# Patient Record
Sex: FEMALE | Race: WHITE
[De-identification: names, ages, dates, MRNs, and addresses within clinical notes are randomized per-mention and may not be internally consistent; named-entity substitution may affect disease eponyms.]

---

## 2020-11-25 ENCOUNTER — HOSPITAL ENCOUNTER (EMERGENCY)
Dept: HOSPITAL 63 - ER | Age: 80
Discharge: ANOTHER HOSPITAL | End: 2020-11-25
Payer: MEDICARE

## 2020-11-25 VITALS — SYSTOLIC BLOOD PRESSURE: 136 MMHG | DIASTOLIC BLOOD PRESSURE: 77 MMHG

## 2020-11-25 VITALS — HEIGHT: 70 IN | WEIGHT: 154.32 LBS | BODY MASS INDEX: 22.09 KG/M2

## 2020-11-25 DIAGNOSIS — I48.0: Primary | ICD-10-CM

## 2020-11-25 DIAGNOSIS — N39.0: ICD-10-CM

## 2020-11-25 LAB
ALBUMIN SERPL-MCNC: 3.3 G/DL (ref 3.4–5)
ALBUMIN/GLOB SERPL: 0.8 {RATIO} (ref 1–1.7)
ALP SERPL-CCNC: 166 U/L (ref 46–116)
ALT SERPL-CCNC: 22 U/L (ref 14–59)
ANION GAP SERPL CALC-SCNC: 8 MMOL/L (ref 6–14)
APTT PPP: YELLOW S
AST SERPL-CCNC: 19 U/L (ref 15–37)
BACTERIA #/AREA URNS HPF: (no result) /HPF
BASOPHILS # BLD AUTO: 0 X10^3/UL (ref 0–0.2)
BASOPHILS NFR BLD: 1 % (ref 0–3)
BILIRUB SERPL-MCNC: 0.4 MG/DL (ref 0.2–1)
BILIRUB UR QL STRIP: (no result)
BUN/CREAT SERPL: 24 (ref 6–20)
CA-I SERPL ISE-MCNC: 22 MG/DL (ref 7–20)
CALCIUM SERPL-MCNC: 8.9 MG/DL (ref 8.5–10.1)
CHLORIDE SERPL-SCNC: 104 MMOL/L (ref 98–107)
CO2 SERPL-SCNC: 27 MMOL/L (ref 21–32)
CREAT SERPL-MCNC: 0.9 MG/DL (ref 0.6–1)
EOSINOPHIL NFR BLD: 0.1 X10^3/UL (ref 0–0.7)
EOSINOPHIL NFR BLD: 1 % (ref 0–3)
ERYTHROCYTE [DISTWIDTH] IN BLOOD BY AUTOMATED COUNT: 14.3 % (ref 11.5–14.5)
FIBRINOGEN PPP-MCNC: (no result) MG/DL
GFR SERPLBLD BASED ON 1.73 SQ M-ARVRAT: 60.4 ML/MIN
GLOBULIN SER-MCNC: 4.1 G/DL (ref 2.2–3.8)
GLUCOSE SERPL-MCNC: 109 MG/DL (ref 70–99)
GLUCOSE UR STRIP-MCNC: (no result) MG/DL
HCT VFR BLD CALC: 42.5 % (ref 36–47)
HGB BLD-MCNC: 13.6 G/DL (ref 12–15.5)
LIPASE: 47 U/L (ref 73–393)
LYMPHOCYTES # BLD: 1.7 X10^3/UL (ref 1–4.8)
LYMPHOCYTES NFR BLD AUTO: 19 % (ref 24–48)
MAGNESIUM SERPL-MCNC: 2.2 MG/DL (ref 1.8–2.4)
MCH RBC QN AUTO: 29 PG (ref 25–35)
MCHC RBC AUTO-ENTMCNC: 32 G/DL (ref 31–37)
MCV RBC AUTO: 92 FL (ref 79–100)
MONO #: 0.7 X10^3/UL (ref 0–1.1)
MONOCYTES NFR BLD: 7 % (ref 0–9)
NEUT #: 6.7 X10^3UL (ref 1.8–7.7)
NEUTROPHILS NFR BLD AUTO: 73 % (ref 31–73)
NITRITE UR QL STRIP: (no result)
PLATELET # BLD AUTO: 347 X10^3/UL (ref 140–400)
POTASSIUM SERPL-SCNC: 4 MMOL/L (ref 3.5–5.1)
PROT SERPL-MCNC: 7.4 G/DL (ref 6.4–8.2)
RBC # BLD AUTO: 4.62 X10^6/UL (ref 3.5–5.4)
RBC #/AREA URNS HPF: (no result) /HPF (ref 0–2)
SODIUM SERPL-SCNC: 139 MMOL/L (ref 136–145)
SP GR UR STRIP: 1.02
SQUAMOUS #/AREA URNS LPF: (no result) /LPF
UROBILINOGEN UR-MCNC: 0.2 MG/DL
WBC # BLD AUTO: 9.1 X10^3/UL (ref 4–11)
WBC #/AREA URNS HPF: >40 /HPF (ref 0–4)

## 2020-11-25 PROCEDURE — 87086 URINE CULTURE/COLONY COUNT: CPT

## 2020-11-25 PROCEDURE — 83690 ASSAY OF LIPASE: CPT

## 2020-11-25 PROCEDURE — 99285 EMERGENCY DEPT VISIT HI MDM: CPT

## 2020-11-25 PROCEDURE — 85025 COMPLETE CBC W/AUTO DIFF WBC: CPT

## 2020-11-25 PROCEDURE — 36415 COLL VENOUS BLD VENIPUNCTURE: CPT

## 2020-11-25 PROCEDURE — 83880 ASSAY OF NATRIURETIC PEPTIDE: CPT

## 2020-11-25 PROCEDURE — 83735 ASSAY OF MAGNESIUM: CPT

## 2020-11-25 PROCEDURE — 81001 URINALYSIS AUTO W/SCOPE: CPT

## 2020-11-25 PROCEDURE — 93005 ELECTROCARDIOGRAM TRACING: CPT

## 2020-11-25 PROCEDURE — 85379 FIBRIN DEGRADATION QUANT: CPT

## 2020-11-25 PROCEDURE — 84484 ASSAY OF TROPONIN QUANT: CPT

## 2020-11-25 PROCEDURE — 71046 X-RAY EXAM CHEST 2 VIEWS: CPT

## 2020-11-25 PROCEDURE — 80053 COMPREHEN METABOLIC PANEL: CPT

## 2020-11-25 PROCEDURE — 85610 PROTHROMBIN TIME: CPT

## 2020-11-25 NOTE — PHYS DOC
Past History


Past Medical History:  A-Fib


Alcohol Use:  None





General Adult


EDM:


Chief Complaint:  RAPID HEART RATE





HPI:


HPI:





Patient is a 80-year-old female coming in for rapid heart rate.  Patient states 

that while she was cooking today she noticed her heart rate was elevated, will 

when checked it was in the 1 15-1 20 range.  Patient has a history of A. fib and

says she is normally well rate and rhythm controlled on sotalol and her heart 

rate is usually in the 60s.  States she called her cardiologist around 1600 and 

was told to take her evening dose of sotalol.  She was instructed to go to the 

emergency department if her heart rate did not respond in an hour and a half.  

Patient denies any chest pain or shortness of breath but says she feels like 

something is "off" in her sternal chest area.  Also states she gets a little 

lightheaded when standing.  Denies any recent illness or decreased p.o. intake. 

States she has been compliant on medications.





Review of Systems:


Review of Systems:


Constitutional:  Denies fever or chills 


Eyes:  Denies change in visual acuity 


HENT:  Denies nasal congestion or sore throat 


Respiratory:  Denies cough or shortness of breath 


Cardiovascular: Substernal chest pain, no lower extremity edema, rapid heart 

rate


GI:  Denies abdominal pain, nausea, vomiting, bloody stools or diarrhea 


: Denies dysuria 


Musculoskeletal:  Denies back pain or joint pain 


Integument:  Denies rash 


Neurologic:  Denies headache, focal weakness or sensory changes 


Endocrine:  Denies polyuria or polydipsia 


Lymphatic:  Denies swollen glands 


Psychiatric:  Denies depression or anxiety





Allergies:


Allergies:





Allergies








Coded Allergies Type Severity Reaction Last Updated Verified


 


  No Known Drug Allergies    11/25/20 No











Physical Exam:


PE:





Constitutional: Well developed, well nourished, no acute distress, non-toxic 

appearance. []


HENT: Normocephalic, atraumatic, bilateral external ears normal, oropharynx 

moist, no oral exudates, nose normal. []


Eyes: PERRLA, EOMI, conjunctiva normal, no discharge. [] 


Neck: Normal range of motion, no tenderness, supple, no stridor. [] 


Cardiovascular: Irregularly irregular rhythm, tachycardia


Lungs & Thorax: Slight basilar crackles


Abdomen: Bowel sounds normal, soft, no tenderness, no masses, no pulsatile 

masses. [] 


Skin: Warm, dry, no erythema, no rash. [] 


Back: No tenderness, no CVA tenderness. [] 


Extremities: No tenderness, no cyanosis, no clubbing, ROM intact, no edema. [] 


Neurologic: Alert and oriented X 3, normal motor function, normal sensory 

function, no focal deficits noted. []


Psychologic: Affect normal, judgement normal, mood normal. []





Current Patient Data:


Vital Signs:





                                   Vital Signs








  Date Time  Temp Pulse Resp B/P (MAP) Pulse Ox O2 Delivery O2 Flow Rate FiO2


 


11/25/20 18:51 97.3 117  143/94 (110) 98 Room Air  


 


11/25/20 18:44   18     











EKG:


EKG:


Regular regular, atrial fibrillation, heart rate 126, no ST elevation or 

depression, left axis deviation.  []





Radiology/Procedures:


Radiology/Procedures:





CHEST PA   LATERAL


 


History: Reason: afib onset this afternoon, lightheaded / Spl. 


Instructions:  / History: 


 


Comparison: CT April 26, 2011


 


Findings:


Hyperinflation. No consolidation or pleural effusion. Normal heart size. 


No pneumothorax. Postop changes upper abdomen.


 


Impression: 


1.  No acute cardiopulmonary process.


 []





Heart Score:


HEART Score for Chest Pain:  








HEART Score for Chest Pain Response (Comments) Value


 


History Slighlty/Non-Suspicious 0


 


ECG Nonspecific Repolarizatio 1


 


Age > 65 2


 


Risk Factors                            1 or 2 Risk Factors 1


 


Total  4








Risk Factors:


Risk Factors:  DM, Current or recent (<one month) smoker, HTN, HLP, family 

history of CAD, obesity.


Risk Scores:


Score 0 - 3:  2.5% MACE over next 6 weeks - Discharge Home


Score 4 - 6:  20.3% MACE over next 6 weeks - Admit for Clinical Observation


Score 7 - 10:  72.7% MACE over next 6 weeks - Early Invasive Strategies





Course & Med Decision Making:


Course & Med Decision Making


Pertinent Labs and Imaging studies reviewed. (See chart for details)


Consult with Dr. Steele who was able to access patient's charts from .  

Does not recommend cardioversion in the emergency department and recommends 

increasing patient's sotalol to 120 mg twice daily and having her follow-up with

 her cardiologist in 2 days.


However patient's repeat troponin at 4 hours after symptom onset shows it has 

gone up from undetectable to 0.045.  Discussed with patient she would like to be

 transferred to  since that is where her cardiologist is.  Discussed with 

transfer line, patient accepted by the hospitalist


[]





[place] Disclaimer:


Dragon Disclaimer:


This electronic medical record was generated, in whole or in part, using a voice

 recognition dictation system.





Departure


Departure:


Impression:  


   Primary Impression:  


   Paroxysmal atrial fibrillation


   Additional Impression:  


   Urinary tract infection


Disposition:  02 DC/TRF OTHER SHORT TERM HOS


Condition:  GUARDED


Referrals:  


Dr. Lackey


Patient Instructions:  Atrial Fibrillation


Scripts


Nitrofurantoin Monohyd/M-Cryst (MACROBID 100 MG CAPSULE) 100 Mg Capsule


1 CAP PO BID for UTI for 5 Days, #10 CAP 0 Refills


   Prov: RAYRAY MARIE MD         11/25/20











RAYRAY MARIE MD              Nov 25, 2020 19:05

## 2020-11-25 NOTE — EKG
[facility] [address], [place] [address]

Test Date:    2020               Test Time:    18:37:55

Pat Name:     KEYON FORBES           Department:   

Patient ID:   [id number]           Room:          

Gender:       F                        Technician:   ASHLEY

:          1940               Requested By: RAYRAY MARIE

Order Number: [id number]            Reading MD:     

                                 Measurements

Intervals                              Axis          

Rate:         126                      P:            

MS:                                    QRS:          -2

QRSD:         76                       T:            -19

QT:           264                                    

QTc:          383                                    

                           Interpretive Statements

IRREGULAR RHYTHM, NO P-WAVE FOUND

LEFTWARD AXIS

ST & T ABNORMALITY, CONSIDER

ANTERIOR ISCHEMIA OR LEFT VENTRICULAR STRAIN

T ABNORMALITY IN LATERAL LEADS

INFEROLATERAL LEADS

ABNORMAL ECG

RI6.02

No previous ECG available for comparison

## 2020-11-25 NOTE — RAD
CHEST PA   LATERAL

 

History: Reason: afib onset this afternoon, lightheaded / Spl. 

Instructions:  / History: 

 

Comparison: CT April 26, 2011

 

Findings:

Hyperinflation. No consolidation or pleural effusion. Normal heart size. 

No pneumothorax. Postop changes upper abdomen.

 

Impression: 

1.  No acute cardiopulmonary process.

 

Electronically signed by: Bill Martines DO (11/25/2020 7:39 PM) [facility]

## 2020-12-13 ENCOUNTER — HOSPITAL ENCOUNTER (EMERGENCY)
Dept: HOSPITAL 63 - ER | Age: 80
Discharge: HOME | End: 2020-12-13
Payer: MEDICARE

## 2020-12-13 VITALS — DIASTOLIC BLOOD PRESSURE: 75 MMHG | SYSTOLIC BLOOD PRESSURE: 125 MMHG

## 2020-12-13 VITALS — BODY MASS INDEX: 22.09 KG/M2 | HEIGHT: 70 IN | WEIGHT: 154.32 LBS

## 2020-12-13 DIAGNOSIS — T45.515A: ICD-10-CM

## 2020-12-13 DIAGNOSIS — R79.1: ICD-10-CM
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
## 2020-12-13 DIAGNOSIS — J18.9: Primary | ICD-10-CM

## 2020-12-13 DIAGNOSIS — I48.91: ICD-10-CM

## 2020-12-13 LAB
ALBUMIN SERPL-MCNC: 2.6 G/DL (ref 3.4–5)
ALBUMIN/GLOB SERPL: 0.7 {RATIO} (ref 1–1.7)
ALP SERPL-CCNC: 108 U/L (ref 46–116)
ALT SERPL-CCNC: 29 U/L (ref 14–59)
ANION GAP SERPL CALC-SCNC: 11 MMOL/L (ref 6–14)
AST SERPL-CCNC: 44 U/L (ref 15–37)
BASOPHILS # BLD AUTO: 0 X10^3/UL (ref 0–0.2)
BASOPHILS NFR BLD: 0 % (ref 0–3)
BILIRUB SERPL-MCNC: 0.7 MG/DL (ref 0.2–1)
BUN/CREAT SERPL: 25 (ref 6–20)
CA-I SERPL ISE-MCNC: 28 MG/DL (ref 7–20)
CALCIUM SERPL-MCNC: 8.5 MG/DL (ref 8.5–10.1)
CHLORIDE SERPL-SCNC: 100 MMOL/L (ref 98–107)
CO2 SERPL-SCNC: 28 MMOL/L (ref 21–32)
CREAT SERPL-MCNC: 1.1 MG/DL (ref 0.6–1)
EOSINOPHIL NFR BLD: 0 % (ref 0–3)
EOSINOPHIL NFR BLD: 0 X10^3/UL (ref 0–0.7)
ERYTHROCYTE [DISTWIDTH] IN BLOOD BY AUTOMATED COUNT: 14 % (ref 11.5–14.5)
GFR SERPLBLD BASED ON 1.73 SQ M-ARVRAT: 47.9 ML/MIN
GLOBULIN SER-MCNC: 3.6 G/DL (ref 2.2–3.8)
GLUCOSE SERPL-MCNC: 141 MG/DL (ref 70–99)
HCT VFR BLD CALC: 42.4 % (ref 36–47)
HGB BLD-MCNC: 13.6 G/DL (ref 12–15.5)
LYMPHOCYTES # BLD: 0.5 X10^3/UL (ref 1–4.8)
LYMPHOCYTES NFR BLD AUTO: 12 % (ref 24–48)
MAGNESIUM SERPL-MCNC: 2.1 MG/DL (ref 1.8–2.4)
MCH RBC QN AUTO: 29 PG (ref 25–35)
MCHC RBC AUTO-ENTMCNC: 32 G/DL (ref 31–37)
MCV RBC AUTO: 90 FL (ref 79–100)
MONO #: 0.5 X10^3/UL (ref 0–1.1)
MONOCYTES NFR BLD: 10 % (ref 0–9)
NEUT #: 3.5 X10^3UL (ref 1.8–7.7)
NEUTROPHILS NFR BLD AUTO: 78 % (ref 31–73)
PLATELET # BLD AUTO: 190 X10^3/UL (ref 140–400)
POTASSIUM SERPL-SCNC: 3.8 MMOL/L (ref 3.5–5.1)
PROT SERPL-MCNC: 6.2 G/DL (ref 6.4–8.2)
RBC # BLD AUTO: 4.7 X10^6/UL (ref 3.5–5.4)
SODIUM SERPL-SCNC: 139 MMOL/L (ref 136–145)
WBC # BLD AUTO: 4.5 X10^3/UL (ref 4–11)

## 2020-12-13 PROCEDURE — 85025 COMPLETE CBC W/AUTO DIFF WBC: CPT

## 2020-12-13 PROCEDURE — 93005 ELECTROCARDIOGRAM TRACING: CPT

## 2020-12-13 PROCEDURE — 85610 PROTHROMBIN TIME: CPT

## 2020-12-13 PROCEDURE — 96372 THER/PROPH/DIAG INJ SC/IM: CPT

## 2020-12-13 PROCEDURE — 85730 THROMBOPLASTIN TIME PARTIAL: CPT

## 2020-12-13 PROCEDURE — 83605 ASSAY OF LACTIC ACID: CPT

## 2020-12-13 PROCEDURE — 84484 ASSAY OF TROPONIN QUANT: CPT

## 2020-12-13 PROCEDURE — 85379 FIBRIN DEGRADATION QUANT: CPT

## 2020-12-13 PROCEDURE — 71046 X-RAY EXAM CHEST 2 VIEWS: CPT

## 2020-12-13 PROCEDURE — 99285 EMERGENCY DEPT VISIT HI MDM: CPT

## 2020-12-13 PROCEDURE — 80053 COMPREHEN METABOLIC PANEL: CPT

## 2020-12-13 PROCEDURE — 83735 ASSAY OF MAGNESIUM: CPT

## 2020-12-13 PROCEDURE — 36415 COLL VENOUS BLD VENIPUNCTURE: CPT

## 2020-12-13 PROCEDURE — 94640 AIRWAY INHALATION TREATMENT: CPT

## 2020-12-13 NOTE — RAD
Exam: Chest 2 views



INDICATION: Shortness of breath



TECHNIQUE: Frontal and lateral views of the chest



Comparisons: 11/25/2020



FINDINGS:

The cardiomediastinal silhouette and pulmonary vessels are within normal limits.



Patchy left basilar airspace disease. No pleural effusion.



IMPRESSION:

Patchy bibasilar airspace disease. This may be infectious or inflammatory in etiology.



Electronically signed by: Balbir Daigle MD (12/13/2020 7:24 PM) [facility]

## 2020-12-13 NOTE — PHYS DOC
Past History


Past Medical History:  A-Fib


Alcohol Use:  None





Adult General


Chief Complaint


Chief Complaint:  SHORTNESS OF BREATH





HPI


HPI





Patient is a 80-year-old female comes emergency department with complaints of 

shortness of breath with a cough for the last several months, patient states is 

been getting a little worse over the past 2 months, patient states she has been 

seeing her primary care Dr. Cookie Fleming several times and does not feel like

she is being treated closely enough, patient states that she had a negative 

Covid test on 1125 of 2020, patient states that she was given a Medrol dose pack

for a diagnosis of seasonal allergies approximately 5 days ago and has just 1 or

2 doses left reports she has done, patient states that Dr. Fleming started her 

on Flonase nasal spray along with Singulair which she started yesterday has 

taken 1 dose and did not feel immediately better so she decided she should come 

into the emergency department and have her symptoms evaluated.  Patient reports 

of a runny nose for greater than a year, states that she has this liquid in her 

throat for approximately a year, patient states that she has been congested and 

coughing up white mucus for greater than 2 months, patient does deny chest pain,

denies shortness of breath however reports that she does get a little short of 

breath if she is up moving around a lot, patient denies any cough at rest, 

patient states that when she goes to bed at night that she does experience some 

cough and this is when she expels her white sputum.  Patient states that she has

a bad stomach and she has been nauseated her entire life and has found no 

medications that help her.  Patient denies any other physical illness or 

physical complaints.





Review of Systems


Review of Systems


14 body systems of review of systems have been reviewed.  See HPI for pertinent 

positives and negative responses, otherwise all other systems are negative, 

nonpertinent or noncontributory.





Current Medications


Current Medications


Amlodipine 5 mg twice daily, 81 mg ASA EC daily, Lipitor 20 mg daily, Wellbutrin

 mg daily, 10 mg Claritin daily, HydroDIURIL 12.5 mg daily, Atrovent 42 

mcg nasal spray daily, Imodium AD as needed, Pacerone 200 mg, prednisone 10 mg, 

Ativan 0.5 mg every 8 hours, Cozaar 100 mg daily, omeprazole DR 20 mg twice 

daily, Osteo Bi-Flex daily, 20 mEq potassium chloride SR daily, Carafate 1 g 

tablet 4 times daily, MVI daily, 5 mg Coumadin daily,





Allergies


Allergies





Allergies








Coded Allergies Type Severity Reaction Last Updated Verified


 


  No Known Drug Allergies    11/25/20 No











Physical Exam


Physical Exam





Constitutional: Well developed, well nourished, no acute distress, non-toxic 

appearance. 


HENT: Normocephalic, atraumatic, bilateral external ears normal, oropharynx 

moist, no oral exudates, nose normal. 


Eyes: PERRLA, EOMI, conjunctiva normal, no discharge.  


Neck: Normal range of motion, no tenderness, supple, no stridor.  


Cardiovascular:Heart rate regular rhythm, no murmur 


Lungs & Thorax: Left lower lobe inspiratory and expiratory crackles to 

auscultation otherwise no adventitious lung sounds noted on left upper lobe or 

right upper middle or lower lobes.


Abdomen: Bowel sounds normal, soft, no tenderness, no masses, no pulsatile 

masses.  


Skin: Warm, dry, no erythema, no rash. 


Back: No tenderness, no CVA tenderness.  


Extremities: No tenderness, no cyanosis, no clubbing, ROM intact, no edema.  


Neurologic: Alert and oriented X 3, normal motor function, normal sensory 

function, no focal deficits noted. 


Psychologic: Affect normal, judgement normal, mood normal.





Current Patient Data


Vital Signs


Patient's initial vital signs upon examination left upper extremity 141/61 on 

NIBP, oral temp 97.6, respirations 16 and nonlabored, heart rate 102, oxygen 

saturation 95% on room air.


Lab Results





Laboratory Tests








Test


 12/13/20


18:25


 


White Blood Count 4.5 x10^3/uL 


 


Red Blood Count 4.70 x10^6/uL 


 


Hemoglobin 13.6 g/dL 


 


Hematocrit 42.4 % 


 


Mean Corpuscular Volume 90 fL 


 


Mean Corpuscular Hemoglobin 29 pg 


 


Mean Corpuscular Hemoglobin


Concent 32 g/dL 





 


Red Cell Distribution Width 14.0 % 


 


Platelet Count 190 x10^3/uL 


 


Neutrophils (%) (Auto) 78 % 


 


Lymphocytes (%) (Auto) 12 % 


 


Monocytes (%) (Auto) 10 % 


 


Eosinophils (%) (Auto) 0 % 


 


Basophils (%) (Auto) 0 % 


 


Neutrophils # (Auto) 3.5 x10^3uL 


 


Lymphocytes # (Auto) 0.5 x10^3/uL 


 


Monocytes # (Auto) 0.5 x10^3/uL 


 


Eosinophils # (Auto) 0.0 x10^3/uL 


 


Basophils # (Auto) 0.0 x10^3/uL 


 


Prothrombin Time > 120.0 SEC 


 


Prothromb Time International


Ratio > 10.0 





 


Activated Partial


Thromboplast Time 68 SEC 





 


D-Dimer (Bianka) 0.29 mg/L 


 


Sodium Level 139 mmol/L 


 


Potassium Level 3.8 mmol/L 


 


Chloride Level 100 mmol/L 


 


Carbon Dioxide Level 28 mmol/L 


 


Anion Gap 11 


 


Blood Urea Nitrogen 28 mg/dL 


 


Creatinine 1.1 mg/dL 


 


Estimated GFR


(Cockcroft-Gault) 47.9 





 


BUN/Creatinine Ratio 25 


 


Glucose Level 141 mg/dL 


 


Lactic Acid Level 1.8 mmol/L 


 


Calcium Level 8.5 mg/dL 


 


Magnesium Level 2.1 mg/dL 


 


Total Bilirubin 0.7 mg/dL 


 


Aspartate Amino Transf


(AST/SGOT) 44 U/L 





 


Alanine Aminotransferase


(ALT/SGPT) 29 U/L 





 


Alkaline Phosphatase 108 U/L 


 


Troponin I Quantitative < 0.017 ng/mL 


 


Total Protein 6.2 g/dL 


 


Albumin 2.6 g/dL 


 


Albumin/Globulin Ratio 0.7 








Current Medications








 Medications


  (Trade)  Dose


 Ordered  Sig/Josh


 Route


 PRN Reason  Start Time


 Stop Time Status Last Admin


Dose Admin


 


 Albuterol Sulfate


  (Ventolin)  2.5 mg  STK-MED ONCE


 .ROUTE


   12/13/20 18:57


 12/13/20 18:57 DC  





 


 Phytonadione


  (Vitamin K)  2.5 mg  1X  ONCE


 SQ


   12/13/20 20:15


 12/13/20 20:31 DC 12/13/20 20:15





 


 Azithromycin


  (Zithromax)  500 mg  1X  ONCE


 PO


   12/13/20 20:30


 12/13/20 20:31 DC 12/13/20 20:30














EKG


EKG


EKG performed at 1842 by ED staff, heart rate 99, irregular heartbeat shows 

atrial fibrillation without other ectopy, no P waves appreciated, QTc interval 

0.462, no ACS, no acute ischemia, no acute STEMI noted, EKG interpreted by ED 

attending Dr. Moore.





Radiology/Procedures


Radiology/Procedures


STATUS: REG ER            ORD. PHYSICIAN: RODRIGUEZ MIRANDA


REASON: SHORT OF BREATH


PROCEDURE: CHEST PA & LATERAL





Exam: Chest 2 views





INDICATION: Shortness of breath





TECHNIQUE: Frontal and lateral views of the chest





Comparisons: 11/25/2020





FINDINGS:


The cardiomediastinal silhouette and pulmonary vessels are within normal limits.





Patchy left basilar airspace disease. No pleural effusion.





IMPRESSION:


Patchy bibasilar airspace disease. This may be infectious or inflammatory in 

etiology.





Electronically signed by: Balbir العراقي MD (12/13/2020 7:24 PM) [facility]














DICTATED AND SIGNED BY:     BALBIR العراقي MD


DATE:     12/13/20 1923





CC: RODRIGUEZ MIRANDA; COOKIE FLEMING MD ~MTH0 0





Heart Score


Risk Factors:


Risk Factors:  DM, Current or recent (<one month) smoker, HTN, HLP, family 

history of CAD, obesity.


Risk Scores:


Risk Factors:  DM, Current or recent (<one month) smoker, HTN, HLP, family 

history of CAD, obesity.





Course & Med Decision Making


Course & Med Decision Making


Pertinent Labs and Imaging studies reviewed. (See chart for details)





80-year-old female presented emergency department with multiple concerns of 

cough and congestion that been going on for several months.  Patient states she 

is followed closely by her primary Dr. Cookie Fleming and has just recently 

been treated with a Medrol Dosepak, Flonase nasal spray, Singulair, patient s

tates that she does not feel like these medications are working, patient has 

asked for a breathing treatment to be done here in the emergency department.  

Physical examination revealed adventitious lung sounds left lower lobe, a 

albuterol treatment was initiated by [place] respiratory therapy.  Upon 

reexamination patient's lungs were clear to auscultation, patient had no further

complaints of shortness of breath, however patient did not have any complaints 

of shortness of breath while she was in the emergency department.  Patient's 

labs were negative for infectious process however patient is on Coumadin, 

patient's INR was greater than 10.  Patient was given 2.5 mg subcu vitamin K, 

discussed elevated INR with patient, recommended she stop taking her Coumadin un

til reevaluated by her primary care physician and/or cardiologist and has her 

serial PT/INR lab work completed and should not start back on her Coumadin until

directed by her primary care physician or cardiologist.  Patient did not show 

any signs of bleeding externally, there were no signs of new bruising on 

patient's extremities, patient did state however that she bruises often since 

starting her Coumadin, patient states that her last INR was only 2.5 but that 

was 3 months ago and is due soon for a INR recheck with her cardiologist.  

Patient's chest x-ray also read by house radiologist interpretation of left 

lower lobe patchy infiltrates without effusion, discussed findings with patient,

will start on Zithromax/Z-Mike.  Patient gave verbal understanding of antibiotic 

use, gave verbal understanding of stopping taking her Coumadin and the reason 

she was receiving vitamin K in the emergency department today.  Patient's curb 

65 score equals 1, not recommended for inpatient treatment for community-

acquired pneumonia, will treat with Z-Mike at home, patient feels comfortable 

treating her pneumonia with Z-Mike at home, patient also has close follow-up with

her primary care physician Dr. Cookie Fleming, patient is nontoxic in 

appearance, does not have any complaints of chest pain or shortness of breath, I

feel comfortable discharging patient home for outpatient trial of community 

acquired pneumonia treatment.  Patient states that she will see her Dr. Fleming 

tomorrow, patient gave verbal understanding of discharge home instructions, 

return to ER concerns, patient had no further questions or concerns, patient 

discharged home without incident.





Diagnosis elevated INR, Coumadin toxicity, community-acquired pneumonia.





Dragon Disclaimer


Dragon Disclaimer


This electronic medical record was generated, in whole or in part, using a voice

recognition dictation system.





Departure


Departure:


Impression:  


   Primary Impression:  


   Elevated INR


   Additional Impressions:  


   Coumadin toxicity


   CAP (community acquired pneumonia)


Disposition:  01 DC HOME SELF CARE/HOMELESS


Condition:  IMPROVED


Referrals:  


COOKIE FLEMING MD (PCP)


Patient Instructions:  Pneumonia, Adult, Warfarin Coagulopathy





Additional Instructions:  


Do not take your Coumadin until directed by either Dr. Felming or your 

cardiologist, your PT/INR was greater than 10, you were given 2.5 mg of vitamin 

K subcutaneously, please follow-up with your doctor tomorrow for a reevaluation 

of your PT/INR and do not start your Coumadin until directed by your doctor.  

You were started on Zithromax for a community-acquired pneumonia, please let 

your Dr. Fleming know that you are taking this medication, follow-up with Dr. Fleming tomorrow, return to the emergency department immediately for worsening 

symptoms or other concerns.





EMERGENCY DEPARTMENT GENERAL DISCHARGE INSTRUCTIONS





Thank you for coming to [facility] (ED) today and trusting us

with you 


care.  We trust that you had a positivie experience in our Emergency Department.

 If you 


wish to speak to the department management, you may call the director at 

[phone].





YOUR FOLLOW UP INSTRUCTIONS ARE AS FOLLOWS:





1.  Do you have a private Doctor?  If you do not have a private doctor, please 

ask for a 


resource list of physicians or clinics that may be able to assist you with 

follow up care.





2.  The Emergency Physician has interpreted your x-rays.  The X-Ray specialist 

will also 


review them.  If there is a change in the findings, you will be notified in 48 

hours when at 


all possible.





3.  A lab test or culture has been done, your results will be reviewed and you 

will be 


notified if you need a change in treatment.





ADDITIONAL INSTRUCTIONS AND INFORMATION:





1.  Your care today has been supervised by a physician who is specially trained 

in emergency 


care.  Many problems require more than one evaluation for a complete diagnosis 

and 


treatment.  We recommend that you schedule your follow up appointment as 

recommended to 


ensure complete treatment of you illness or injury.  If you are unable to obtain

follow up 


care and continue to have a problem, or if your condition worsens, we recommend 

that you 


return to the ED.





2.  We are not able to safely determine your condition over the phone nor are we

able to 


give sound medical advice over the phone.  For these safety reasons, if you call

for medical 


advice we will ask you to come to the ED for further evaluation.





3.  If you have any questions regarding these discharge instructions please call

the ED at 


[phone].





SAFETY INFORMATION:





In the interest of safety, wellness, and injury prevention; we encourage you to 

wear your 


sealbelt, if you smoke; quite smoking, and we encourage family to use a 

protective helmet 


for bicycling and other sporting events that present an increased risk for head 

injury.





IF YOUR SYMPTOMS WORSEN OR NEW SYMPTOMS DEVELOP, OR YOU HAVE CONCERNS ABOUT YOUR

CONDITION; 


OR IF YOUR CONDITION WORSENS WHILE YOU ARE WAITING FOR YOUR FOLLOW UP 

APPOINTMENT; EITHER 


CONTACT YOUR PRIMARY CARE DOCTOR, THE PHYSICIAN WHOSE NAME AND NUMBER YOU WERE 

GIVEN, OR 


RETURN TO THE ED IMMEDIATELY.


Scripts


Azithromycin (ZITHROMAX) 250 Mg Tablet


1 PKG PO UD for PNEUMONIA, #6 TAB 0 Refills


   Prov: RODRIGUEZ MIRANDA         12/13/20





Problem Qualifiers








   Additional Impressions:  


   Coumadin toxicity


   Encounter type:  initial encounter  Injury intent:  accidental or uninten

   tional  Qualified Codes:  T45.511A - Poisoning by anticoagulants, accidental 

   (unintentional), initial encounter


   CAP (community acquired pneumonia)


   Laterality:  left  Lung location:  lower lobe of lung  Qualified Codes:  

   J18.9 - Pneumonia, unspecified organism








RODRIGUEZ MIRANDA       Dec 13, 2020 18:27

## 2020-12-13 NOTE — EKG
[facility] [address], [place] [address]

Test Date:    2020               Test Time:    18:42:02

Pat Name:     KEYON FORBES           Department:   

Patient ID:   [id number]           Room:          

Gender:       F                        Technician:   

:          1940               Requested By: RODRIGUEZ MIRANDA

Order Number: [id number]            Reading MD:     

                                 Measurements

Intervals                              Axis          

Rate:         99                       P:            0

VT:           164                      QRS:          -10

QRSD:         88                       T:            259

QT:           356                                    

QTc:          462                                    

                           Interpretive Statements

SINUS RHYTHM

LEFTWARD AXIS

CONSIDER RIGHT VENTRICULAR HYPERTROPHY

ST & T ABNORMALITY, CONSIDER

ANTERIOR ISCHEMIA OR LEFT VENTRICULAR STRAIN

LATERAL ISCHEMIA OR LEFT VENTRICULAR STRAIN

INFEROLATERAL ISCHEMIA OR LEFT VENTRICULAR STRAIN

ABNORMAL ECG

RI6.02

No previous ECG available for comparison

## 2020-12-15 ENCOUNTER — HOSPITAL ENCOUNTER (INPATIENT)
Dept: HOSPITAL 63 - ER | Age: 80
LOS: 7 days | Discharge: HOME | DRG: 177 | End: 2020-12-22
Attending: HOSPITALIST | Admitting: HOSPITALIST
Payer: MEDICARE

## 2020-12-15 VITALS — SYSTOLIC BLOOD PRESSURE: 114 MMHG | DIASTOLIC BLOOD PRESSURE: 72 MMHG

## 2020-12-15 VITALS — DIASTOLIC BLOOD PRESSURE: 53 MMHG | SYSTOLIC BLOOD PRESSURE: 109 MMHG

## 2020-12-15 VITALS — HEIGHT: 70 IN | WEIGHT: 144.4 LBS | BODY MASS INDEX: 20.67 KG/M2

## 2020-12-15 DIAGNOSIS — M10.9: ICD-10-CM

## 2020-12-15 DIAGNOSIS — K21.9: ICD-10-CM

## 2020-12-15 DIAGNOSIS — Z79.01: ICD-10-CM

## 2020-12-15 DIAGNOSIS — I10: ICD-10-CM
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
## 2020-12-15 DIAGNOSIS — U07.1: Primary | ICD-10-CM

## 2020-12-15 DIAGNOSIS — T45.515A: ICD-10-CM

## 2020-12-15 DIAGNOSIS — J96.01: ICD-10-CM

## 2020-12-15 DIAGNOSIS — J12.89: ICD-10-CM

## 2020-12-15 DIAGNOSIS — Z80.1: ICD-10-CM

## 2020-12-15 DIAGNOSIS — J06.9: ICD-10-CM

## 2020-12-15 DIAGNOSIS — D68.8: ICD-10-CM

## 2020-12-15 DIAGNOSIS — Z87.891: ICD-10-CM

## 2020-12-15 DIAGNOSIS — G47.00: ICD-10-CM

## 2020-12-15 DIAGNOSIS — E78.5: ICD-10-CM

## 2020-12-15 DIAGNOSIS — Z95.818: ICD-10-CM

## 2020-12-15 DIAGNOSIS — E87.1: ICD-10-CM

## 2020-12-15 DIAGNOSIS — I48.20: ICD-10-CM

## 2020-12-15 DIAGNOSIS — E87.6: ICD-10-CM

## 2020-12-15 DIAGNOSIS — I48.0: ICD-10-CM

## 2020-12-15 LAB
ANION GAP SERPL CALC-SCNC: 8 MMOL/L (ref 6–14)
BASOPHILS # BLD AUTO: 0 X10^3/UL (ref 0–0.2)
BASOPHILS NFR BLD: 0 % (ref 0–3)
CA-I SERPL ISE-MCNC: 29 MG/DL (ref 7–20)
CALCIUM SERPL-MCNC: 8.5 MG/DL (ref 8.5–10.1)
CHLORIDE SERPL-SCNC: 96 MMOL/L (ref 98–107)
CO2 SERPL-SCNC: 27 MMOL/L (ref 21–32)
CREAT SERPL-MCNC: 0.9 MG/DL (ref 0.6–1)
EOSINOPHIL NFR BLD: 0 % (ref 0–3)
EOSINOPHIL NFR BLD: 0 X10^3/UL (ref 0–0.7)
ERYTHROCYTE [DISTWIDTH] IN BLOOD BY AUTOMATED COUNT: 14.1 % (ref 11.5–14.5)
GFR SERPLBLD BASED ON 1.73 SQ M-ARVRAT: 60.4 ML/MIN
GLUCOSE SERPL-MCNC: 106 MG/DL (ref 70–99)
HCT VFR BLD CALC: 41.6 % (ref 36–47)
HGB BLD-MCNC: 13.5 G/DL (ref 12–15.5)
INFLUENZA A PATIENT: NEGATIVE
INFLUENZA B PATIENT: NEGATIVE
LYMPHOCYTES # BLD: 0.6 X10^3/UL (ref 1–4.8)
LYMPHOCYTES NFR BLD AUTO: 9 % (ref 24–48)
MCH RBC QN AUTO: 29 PG (ref 25–35)
MCHC RBC AUTO-ENTMCNC: 32 G/DL (ref 31–37)
MCV RBC AUTO: 89 FL (ref 79–100)
MONO #: 0.5 X10^3/UL (ref 0–1.1)
MONOCYTES NFR BLD: 8 % (ref 0–9)
NEUT #: 5.5 X10^3UL (ref 1.8–7.7)
NEUTROPHILS NFR BLD AUTO: 84 % (ref 31–73)
PLATELET # BLD AUTO: 213 X10^3/UL (ref 140–400)
POTASSIUM SERPL-SCNC: 3.3 MMOL/L (ref 3.5–5.1)
RBC # BLD AUTO: 4.66 X10^6/UL (ref 3.5–5.4)
SODIUM SERPL-SCNC: 131 MMOL/L (ref 136–145)
WBC # BLD AUTO: 6.6 X10^3/UL (ref 4–11)

## 2020-12-15 PROCEDURE — 81001 URINALYSIS AUTO W/SCOPE: CPT

## 2020-12-15 PROCEDURE — 85027 COMPLETE CBC AUTOMATED: CPT

## 2020-12-15 PROCEDURE — 84484 ASSAY OF TROPONIN QUANT: CPT

## 2020-12-15 PROCEDURE — 71045 X-RAY EXAM CHEST 1 VIEW: CPT

## 2020-12-15 PROCEDURE — U0003 INFECTIOUS AGENT DETECTION BY NUCLEIC ACID (DNA OR RNA); SEVERE ACUTE RESPIRATORY SYNDROME CORONAVIRUS 2 (SARS-COV-2) (CORONAVIRUS DISEASE [COVID-19]), AMPLIFIED PROBE TECHNIQUE, MAKING USE OF HIGH THROUGHPUT TECHNOLOGIES AS DESCRIBED BY CMS-2020-01-R: HCPCS

## 2020-12-15 PROCEDURE — 36415 COLL VENOUS BLD VENIPUNCTURE: CPT

## 2020-12-15 PROCEDURE — 93005 ELECTROCARDIOGRAM TRACING: CPT

## 2020-12-15 PROCEDURE — 82550 ASSAY OF CK (CPK): CPT

## 2020-12-15 PROCEDURE — 87040 BLOOD CULTURE FOR BACTERIA: CPT

## 2020-12-15 PROCEDURE — 85730 THROMBOPLASTIN TIME PARTIAL: CPT

## 2020-12-15 PROCEDURE — 85610 PROTHROMBIN TIME: CPT

## 2020-12-15 PROCEDURE — 83605 ASSAY OF LACTIC ACID: CPT

## 2020-12-15 PROCEDURE — 80053 COMPREHEN METABOLIC PANEL: CPT

## 2020-12-15 PROCEDURE — 80048 BASIC METABOLIC PNL TOTAL CA: CPT

## 2020-12-15 PROCEDURE — 85025 COMPLETE CBC W/AUTO DIFF WBC: CPT

## 2020-12-15 PROCEDURE — 87804 INFLUENZA ASSAY W/OPTIC: CPT

## 2020-12-15 PROCEDURE — 87086 URINE CULTURE/COLONY COUNT: CPT

## 2020-12-15 PROCEDURE — 83880 ASSAY OF NATRIURETIC PEPTIDE: CPT

## 2020-12-15 NOTE — PHYS DOC
Past History


Past Medical History:  A-Fib


 (LATISHALEXUS KELLY)


Past Surgical History:  No Surgical History


 (LEXUS GOOD DO)


Alcohol Use:  None


 (LEXUS GOOD DO)





Adult General


Chief Complaint


Chief Complaint:  WEAKNESS/GENERALIZED





HPI


HPI





Patient is a 79yo female presenting for SHOB. She was seen at our facility ~48 

hours ago. She has had UTI-like symptoms and progressive shortness of breath 

without fever or productive cough. She has been at home with  who has 

been experiencing similar symptoms, no known COVID-19 contact. She reports 

increased work of breathing for past 12 hours with poor sleep which concerned 

her prompting her to come back for repeat evaluation


 (LEXUS GOOD DO)





Review of Systems


Review of Systems


Fourteen body systems of review of systems have been reviewed. See HPI for 

pertinent positives and negative responses, other wise all other systems are 

negative, non-pertinent or non-contributory


 (LATISHALEXUS KELLY)





Allergies


Allergies





Allergies








Coded Allergies Type Severity Reaction Last Updated Verified


 


  No Known Drug Allergies    12/15/20 No








 (LEXUS GOOD DO)





Physical Exam


Physical Exam


Constitutional: 


Pt is oriented to person, place, and time. Pt appears well-developed and thin. 

Hypoxic on room air





HEENT:


Head: Normocephalic and atraumatic.


External ears unremarkable


Conjunctivae and EOM are normal. Pupils are equal, round, and reactive to light.


Oropharynx is clear and moist with post-nasal drip present


No hematomas or lacerations or abrasions to face or scalp


OP clear, no blood, no malocclusion, dentition intact


Nares clear, no nasal septal hematoma


Midface stable





Neck:


C-spine midline nontender, no step-offs





Cardiovascular: 


Tachycardic rate, irregular rhythm and normal heart sounds.





Pulmonary/Chest: 


Effort normal, course breath sounds bilaterally with diminished sounds in 

bilateral lower lobes. No respiratory distress. No wheezes.  





Abdominal: 


Soft. Bowel sounds are normal. Pt exhibits no distension. There is no 

tenderness.





Musculoskeletal: 


No bony tenderness to extremities, no deformities, full ROM extremities


Chest wall stable


Pelvis stable and non-tender


No vertebral TTP and spine without stepoffs





Neurological: 


Pt is alert and oriented to person, place, and time.


Moving all extremities willfully, able to wiggle all fingers and toes


Alert and oriented x 3


Sensation grossly intact





Skin: 


Skin is warm and dry. No abrasions, no lacerations





Psychiatric: 


Behavior is appropriate for situation


 (LEXUS GOOD DO)





Current Patient Data


Vital Signs





Vital Signs








  Date Time  Temp Pulse Resp B/P (MAP) Pulse Ox O2 Delivery O2 Flow Rate FiO2


 


12/15/20 17:43  93 17 125/87 (100) 93  2.0 


 


12/15/20 14:45 98.6     Room Air  








Lab Results





Laboratory Tests








Test


 12/15/20


15:21 12/15/20


15:22


 


White Blood Count


 6.6 x10^3/uL


(4.0-11.0) 





 


Red Blood Count


 4.66 x10^6/uL


(3.50-5.40) 





 


Hemoglobin


 13.5 g/dL


(12.0-15.5) 





 


Hematocrit


 41.6 %


(36.0-47.0) 





 


Mean Corpuscular Volume 89 fL ()  


 


Mean Corpuscular Hemoglobin 29 pg (25-35)  


 


Mean Corpuscular Hemoglobin


Concent 32 g/dL


(31-37) 





 


Red Cell Distribution Width


 14.1 %


(11.5-14.5) 





 


Platelet Count


 213 x10^3/uL


(140-400) 





 


Neutrophils (%) (Auto) 84 % (31-73)  


 


Lymphocytes (%) (Auto) 9 % (24-48)  


 


Monocytes (%) (Auto) 8 % (0-9)  


 


Eosinophils (%) (Auto) 0 % (0-3)  


 


Basophils (%) (Auto) 0 % (0-3)  


 


Neutrophils # (Auto)


 5.5 x10^3uL


(1.8-7.7) 





 


Lymphocytes # (Auto)


 0.6 x10^3/uL


(1.0-4.8) 





 


Monocytes # (Auto)


 0.5 x10^3/uL


(0.0-1.1) 





 


Eosinophils # (Auto)


 0.0 x10^3/uL


(0.0-0.7) 





 


Basophils # (Auto)


 0.0 x10^3/uL


(0.0-0.2) 





 


Prothrombin Time


 44.7 SEC


(9.4-11.4) 





 


Prothromb Time International


Ratio 4.6 (0.9-1.1) 


 





 


Activated Partial


Thromboplast Time 50 SEC (23-33) 


 





 


Sodium Level


 131 mmol/L


(136-145) 





 


Potassium Level


 3.3 mmol/L


(3.5-5.1) 





 


Chloride Level


 96 mmol/L


() 





 


Carbon Dioxide Level


 27 mmol/L


(21-32) 





 


Anion Gap 8 (6-14)  


 


Blood Urea Nitrogen


 29 mg/dL


(7-20) 





 


Creatinine


 0.9 mg/dL


(0.6-1.0) 





 


Estimated GFR


(Cockcroft-Gault) 60.4 


 





 


Glucose Level


 106 mg/dL


(70-99) 





 


Lactic Acid Level


 1.3 mmol/L


(0.4-2.0) 





 


Calcium Level


 8.5 mg/dL


(8.5-10.1) 





 


Creatine Kinase


 116 U/L


() 





 


Troponin I Quantitative


 < 0.017 ng/mL


(0-0.055) 





 


NT-Pro-B-Type Natriuretic


Peptide 1794 pg/mL


(0-449) 





 


Influenza Type A (Rapid)


 


 Negative


(NEGATIVE)


 


Influenza Type B (Rapid)


 


 Negative


(NEGATIVE)








 (LEXUS GOOD DO)





EKG


EKG


EKG ordered and interpreted by myself its 1504 hrs. as atrial fibrillation with 

ventricular rate of 102 bpm, prolonged , left axis deviation, nonspecific

ST-T wave abnormalities noted in V4, V5 and V6


 (LEXUS GOOD DO)





Radiology/Procedures


Radiology/Procedures





XR CHEST 1V





History: Reason: FATIGUE / Spl. Instructions:  / History: 





Comparison: 2020





Findings:


Patchy bibasilar opacities, increased on the left. Small left pleural effusion, 

unchanged. No pneumothorax. Unchanged heart size. Postoperative changes upper 

abdomen.





Impression: 


1.  Patchy bibasilar opacities, increased on the left.


2.  Stable small left pleural effusion.





Electronically signed by: Bill Martines DO (12/15/2020 3:37 PM) [id number]








 (LEXUS GOOD DO)


Radiology/Procedures


[facility] [address], [place] [address]


                                 [phone]


                                        


                                 IMAGING REPORT





                                     Signed





PATIENT: KEYON FORBES ACCOUNT: [id number]     MRN#: [id number]


: 1940           LOCATION: ER              AGE: 80


SEX: F                    EXAM DT: 12/15/20         ACCESSION#: [id number]


STATUS: REG ER            ORD. PHYSICIAN: LEXUS GOOD DO


REASON: FATIGUE


PROCEDURE: PORTABLE CHEST 1V





XR CHEST 1V





History: Reason: FATIGUE / Spl. Instructions:  / History: 





Comparison: 2020





Findings:


Patchy bibasilar opacities, increased on the left. Small left pleural effusion, 

unchanged. No pneumothorax. Unchanged heart size. Postoperative changes upper 

abdomen.





Impression: 


1.  Patchy bibasilar opacities, increased on the left.


2.  Stable small left pleural effusion.





Electronically signed by: Bill Martines DO (12/15/2020 3:37 PM) [id number]














DICTATED AND SIGNED BY:     BILL MARTINES DO


DATE:     12/15/20 1535





CC: LEXUS GOOD DO; COOKIE FLEMING MD ~MTH0 0





 (LEOPOLDO PACHECO MD)


Heart Score


HEART Score for Chest Pain:  








HEART Score for Chest Pain Response (Comments) Value


 


History Slighlty/Non-Suspicious 0


 


ECG Nonspecific Repolarizatio 1


 


Age > 65 2


 


Risk Factors >3 Risk Factors or Hx CAD 2


 


Troponin < Normal Limit 0


 


Total  5








Risk Factors:


Risk Factors:  DM, Current or recent (<one month) smoker, HTN, HLP, family 

history of CAD, obesity.


Risk Scores:


Risk Factors:  DM, Current or recent (<one month) smoker, HTN, HLP, family 

history of CAD, obesity.


 (LEXUS GOOD DO)





Course & Med Decision Making


Course & Med Decision Making


Pertinent Labs and Imaging studies reviewed. (See chart for details)





Discussed most likely diagnosis of room air hypoxia likely due to bibasilar PNA,

cannot exclude COVID-19. Patient tested for this and results pending


I reviewed case with on call hospitalist who agreed to admission. Patient 

updated on plan of care and amenable to admission. IV antibiotics started, 

patient to remain on supplemental oxygen and continued inpatient care will ensue


All questions and concerns addressed prior to ED transfer to [facility] for 

admission




















Given the high probability of imminent or life threatening deterioration of the 

patients condition without intervention, the patient was immediately assessed 

by myself and the nurse, and cardiac monitoring initiated. The patient was also 

placed on oxygen and continuous pulse oximetry initiated.


During the course of the patients stay, I spent a considerable amount of time 

at the bedside performing serial re-evaluations of the patients hemodynamic and

clinical status because of the recognized potential threat to life or limb in 

this condition.


Clinical management of this patient involved high complexity decision making to 

assess, manipulate, and support vital organ system failure. I then had a chance 

to review all of the available laboratory and radiographic studies obtained 

today, and I also reviewed old records available to me at the time. Sequential 

vital signs were obtained.


Critical care time noted below was time spent engaged in work directly related 

to the individual patients care, not including time performing procedures; 

however it does include time spent at the immediate bedside or elsewhere on the 

floor or unit.


TOTAL CRITICAL CARE TIME ELAPSED: in excess of 30 minutes.


BODY SYSTEM AT HIGHEST RISK: Pulmonary


 (LEXUS GOOD DO)





Dragon Disclaimer


Dragon Disclaimer


This electronic medical record was generated, in whole or in part, using a voice

recognition dictation system.


 (LEXUS GOOD DO)





Departure


Departure:


Impression:  


   Primary Impression:  


   Pneumonia


   Additional Impressions:  


   Person under investigation for COVID-19


   Hypoxia


   Atrial fibrillation


   Warfarin anticoagulation


Disposition:  09 ADMITTED AS INPT THIS HOSP


Admitting Physician:  Vin Hunt


 (LEXUS GOOD DO)


Condition:  STABLE


Referrals:  


COOKIE FLEMING MD (PCP)





Dragon Disclaimer


This chart was dictated in whole or in part using Voice Recognition software in 

a busy, high-work load, and often noisy Emergency Department environment.  It 

may contain unintended and wholly unrecognized errors or omissions.


 (LEOPOLDO PACHECO MD)





Dragon Disclaimer


This chart was dictated in whole or in part using Voice Recognition software in 

a busy, high-work load, and often noisy Emergency Department environment.  It 

may contain unintended and wholly unrecognized errors or omissions.


 (LEOPOLDO PACHECO MD)





Problem Qualifiers











LEXUS GOOD DO                 Dec 15, 2020 15:06


LEOPOLDO PACHECO MD           Dec 15, 2020 18:49

## 2020-12-15 NOTE — RAD
XR CHEST 1V



History: Reason: FATIGUE / Spl. Instructions:  / History: 



Comparison: December 13, 2020



Findings:

Patchy bibasilar opacities, increased on the left. Small left pleural effusion, unchanged. No pneumot
horax. Unchanged heart size. Postoperative changes upper abdomen.



Impression: 

1.  Patchy bibasilar opacities, increased on the left.

2.  Stable small left pleural effusion.



Electronically signed by: Bill Martines DO (12/15/2020 3:37 PM) [id number]

## 2020-12-15 NOTE — HP
ADMIT DATE:  12/15/2020



ATTENDING PHYSICIAN:  Dr. Montes De Oca.



CHIEF COMPLAINT:  Weakness and mild shortness of breath.



HISTORY OF PRESENT ILLNESS:  The patient is a 80-year-old female admitted

through the ED with new onset of generalized weakness.  She was seen in the ED,

just 2 days prior, she had a coagulopathy at that time, her Coumadin levels were

elevated, INR of 10, no active bleeding, today is still high at 4.6.  In the ED,

she had a chest x-ray done, which showed bibasilar infiltrates.  She has chronic

left pleural effusion.  No fevers, cough or congestion.  No COVID exposure. 

Swab is pending at this time.  She is admitted then with community-acquired

pneumonia and coagulopathy.



PAST MEDICAL HISTORY:  Significant for paroxysmal atrial fibrillation, essential

hypertension, gout and gastroesophageal reflux disease.



PAST SURGICAL HISTORY:  She is unclear about previous abdominal surgery.  She

tells me she had a recent Watchman procedure to treat her chronic atrial

fibrillation.  Essentially, she had resection of the atrial appendage.



ALLERGIES:  She has no known drug allergies.



SOCIAL HISTORY:  She was a smoker up to several years ago.  She denies any

alcohol use.



CURRENT MEDICATIONS:  Include Norvasc, aspirin, Zithromax, colchicine, Atrovent,

lorazepam, losartan, Prilosec, Betapace twice a day, Coumadin,

hydrochlorothiazide and Carafate.



FAMILY HISTORY:  Noncontributory.



REVIEW OF SYSTEMS:  Significant for the irregular heartbeat.  She is still in

atrial fibrillation.  She was supposed to have a followup visit with the

cardiologist in 2 weeks.  She denied any recent COVID exposure.  No cough,

generalized weakness, some congestion.  No nausea, vomiting, diarrhea.  All

other systems were reviewed and turned to be negative.  There is no active

bleeding or bleeding from her gums or GI tract.



PHYSICAL EXAMINATION:

GENERAL:  When I saw her, this is a very pleasant elderly female in no acute

distress.  Her pulse is 102 and irregularly irregular, temperature 98.6 degrees

Fahrenheit, blood pressure 125/81, oxygen saturation 88% on room air.

HEENT:  Head is without trauma.  Pupils are reactive.  Sclerae nonicteric. 

Oropharynx clear.

NECK:  Supple.

LUNGS:  Diminished breath sounds at both bases.

CARDIOVASCULAR:  Show irregularly irregular rhythm.  No gallops.  Peripheral

pulses are palpable and full.

ABDOMEN:  Soft, scaphoid, nontender, no organomegaly.  Bowel sounds are

hypoactive.

EXTREMITIES:  Showed no cyanosis or edema.

NEUROLOGIC:  Focally intact.  Speech is fluent.   intact.

SKIN:  Otherwise warm and dry.



PERTINENT LABORATORY STUDIES:  Chest x-ray as noted.  Hemoglobin is 13.5 g/dL

with white count of 6600.  Sodium 131 mEq, potassium 3.3 mEq, creatinine is 0.9

mg/dL.  Cardiac enzymes negative for coronary ischemia.  BNP was slightly

elevated at 1794.



ASSESSMENT:

1.  A 80-year-old female with upper respiratory tract infection, probable

community-acquired pneumonia.

2.  Rule out COVID-19 coronavirus.

3.  Paroxysmal atrial fibrillation.

4.  Essential hypertension.

5.  Mild hypokalemia due to diuretics.



PLAN:

1.  Admit to the inpatient unit.

2.  Telemetry monitoring.

3.  Await COVID-19 swabs.

4.  Continue Betapace.

5.  Hold hydrochlorothiazide.

6.  Hold Coumadin for now, we are allowing her INR to drift down.

7.  Serial chemistries and INRs.

8.  Diet as tolerated.

9.  Empiric Rocephin for antibiotics.





______________________________

TELLO MONTES DE OCA MD



DR:  GARO/deedee  JOB#:  [id number] / [id number]

DD:  12/15/2020 17:23  DT:  12/15/2020 18:07

## 2020-12-15 NOTE — EKG
[facility] [address], [place] [address]

Test Date:    2020-12-15               Test Time:    14:54:31

Pat Name:     KEYON FORBES           Department:   

Patient ID:   [id number]           Room:          

Gender:       F                        Technician:   CHARLEY

:          1940               Requested By: LEXUS GOOD

Order Number: [id number]            Reading MD:     

                                 Measurements

Intervals                              Axis          

Rate:         102                      P:            

NE:                                    QRS:          -11

QRSD:         90                       T:            203

QT:           360                                    

QTc:          474                                    

                           Interpretive Statements

ATRIAL FLUTTER

LEFTWARD AXIS

ST & T ABNORMALITY, CONSIDER

ANTEROLATERAL ISCHEMIA OR LEFT VENTRICULAR STRAIN

INFEROLATERAL ISCHEMIA OR LEFT VENTRICULAR STRAIN

T ABNORMALITY IN ANTERIOR LEADS

ABNORMAL ECG

RI6.02

No previous ECG available for comparison

## 2020-12-16 VITALS — SYSTOLIC BLOOD PRESSURE: 110 MMHG | DIASTOLIC BLOOD PRESSURE: 55 MMHG

## 2020-12-16 VITALS — DIASTOLIC BLOOD PRESSURE: 62 MMHG | SYSTOLIC BLOOD PRESSURE: 95 MMHG

## 2020-12-16 VITALS — SYSTOLIC BLOOD PRESSURE: 93 MMHG | DIASTOLIC BLOOD PRESSURE: 53 MMHG

## 2020-12-16 VITALS — SYSTOLIC BLOOD PRESSURE: 93 MMHG | DIASTOLIC BLOOD PRESSURE: 52 MMHG

## 2020-12-16 VITALS — DIASTOLIC BLOOD PRESSURE: 55 MMHG | SYSTOLIC BLOOD PRESSURE: 97 MMHG

## 2020-12-16 LAB
APTT PPP: YELLOW S
BACTERIA #/AREA URNS HPF: (no result) /HPF
BILIRUB UR QL STRIP: (no result)
FIBRINOGEN PPP-MCNC: CLEAR MG/DL
GLUCOSE UR STRIP-MCNC: (no result) MG/DL
NITRITE UR QL STRIP: (no result)
RBC #/AREA URNS HPF: 0 /HPF (ref 0–2)
SP GR UR STRIP: >=1.03
SQUAMOUS #/AREA URNS LPF: (no result) /LPF
UROBILINOGEN UR-MCNC: 1 MG/DL
WBC #/AREA URNS HPF: (no result) /HPF (ref 0–4)

## 2020-12-16 RX ADMIN — AMIODARONE HYDROCHLORIDE SCH MG: 200 TABLET ORAL at 20:12

## 2020-12-16 RX ADMIN — ASPIRIN 81 MG SCH MG: 81 TABLET ORAL at 12:03

## 2020-12-16 RX ADMIN — POTASSIUM CHLORIDE SCH MEQ: 1500 TABLET, EXTENDED RELEASE ORAL at 12:04

## 2020-12-16 RX ADMIN — ATORVASTATIN CALCIUM SCH MG: 20 TABLET, FILM COATED ORAL at 12:04

## 2020-12-16 RX ADMIN — CETIRIZINE HYDROCHLORIDE SCH MG: 10 TABLET, FILM COATED ORAL at 12:04

## 2020-12-16 RX ADMIN — DEXAMETHASONE SODIUM PHOSPHATE SCH MG: 10 INJECTION INTRAMUSCULAR; INTRAVENOUS at 16:57

## 2020-12-16 RX ADMIN — SUCRALFATE SCH GM: 1 TABLET ORAL at 22:16

## 2020-12-16 RX ADMIN — SUCRALFATE SCH GM: 1 TABLET ORAL at 16:57

## 2020-12-16 RX ADMIN — Medication SCH CAP: at 12:04

## 2020-12-16 RX ADMIN — AZITHROMYCIN DIHYDRATE SCH MLS/HR: 500 INJECTION, POWDER, LYOPHILIZED, FOR SOLUTION INTRAVENOUS at 16:57

## 2020-12-16 RX ADMIN — SUCRALFATE SCH GM: 1 TABLET ORAL at 12:03

## 2020-12-16 RX ADMIN — AMIODARONE HYDROCHLORIDE SCH MG: 200 TABLET ORAL at 12:04

## 2020-12-16 RX ADMIN — Medication SCH CAP: at 20:11

## 2020-12-16 RX ADMIN — LOPERAMIDE HCL PRN MG: 1 SUSPENSION ORAL at 12:17

## 2020-12-16 NOTE — PN
DATE:  12/16/2020



SUBJECTIVE:  The patient was admitted yesterday with complaint of cough,

dizziness, lightheadedness, shortness of breath, but denied any fever and

apparently was seen in the Emergency Room.  Her chest x-ray showed patchy

bibasilar opacities increased on the left, stable small left side pleural

effusion and was admitted with diagnosis of community-acquired pneumonia;

however, she was also swabbed to rule out possibility of COVID-19.  When I saw

her today, she continued to have cough, is mostly nonproductive, but denied any

chest pain.  She did complain of shortness of breath.  She continued to

desaturate to the right mid 80s on room air.  Her oxygen improved to about

92-94% on 2 liters of oxygen.  She continued to have markedly ____ irregular

fast rate, especially on exertion and she talks.  At rest, her heart rate is

around  beats per minute.



OBJECTIVE:

GENERAL:  When I examined her this afternoon, she looked pale, somewhat

cachectic, but no jaundice, cyanosis, or thyromegaly.  No jugular venous

distention or limb edema.

VITAL SIGNS:  Her heart rate was 105, blood pressure was 108/65, temperature was

98, respiratory rate was 14 and oxygen saturation was 94% on 2 liters of oxygen.

HEAD, EYES, EARS, NOSE AND THROAT:  Showed normocephalic and atraumatic.

NECK:  Supple.

HEART:  Showed normal first and second heart sounds with no gallop, rub or

murmur.

CHEST:  Clear to auscultation.  No crepitation or rhonchi.

ABDOMEN:  Distended, soft, nontender.  No guarding or rigidity.  No

organomegaly.  All hernial orifice intact.  Bowel sounds normal.

NEUROLOGICAL:  She is hard of hearing, but otherwise all cranial nerves intact.

EXTREMITIES:  She moves extremities without difficulty.



LABORATORY DATA:  Her lab work on admission showed a white cell count 6600,

hemoglobin 13.5, hematocrit 41.8, MCV 89 and platelet count of 213,000 with a

manual differential showed 84% polymorphs, 9% monocytes, and 9% lymphocytes. 

Her prothrombin time was 44.7, INR 4.6, aPTT was 50.  Her chemistry showed a

serum sodium 131, potassium 3.3, chloride 96, bicarbonate 27, anion gap of 8,

BUN 29, creatinine 0.9, estimated GFR was 60 mL per minute.  Her glucose 106,

calcium was 8.5.  CK was 116.  Urinalysis was essentially unremarkable.  Her

influenza A and B were negative.  Her chest x-ray showed patchy bibasilar

opacities, increased on the small left side pleural effusion, unchanged.  No

pneumothorax, unchanged heart size, postoperative changes in upper abdomen.



ASSESSMENT AND PLAN:  The patient continues to be on all her medications.  She

was treated with ceftriaxone yesterday.  I would restart her back on ceftriaxone

and Zithromax.  I will discontinue her hydrochlorothiazide and hold all her

antihypertensive medication as she is borderline hypotensive.  She apparently

scheduled for cardioversion in sometime next week; however, I will consult our

Cardiology team to evaluate her and see if adjustment of her medication might

slow her heart rate.





______________________________

LAURA COLUNGA MD



DR:  CEDRIC/deedee  JOB#:  [id number] / [id number]

DD:  12/16/2020 16:25  DT:  12/16/2020 16:38

## 2020-12-17 VITALS — DIASTOLIC BLOOD PRESSURE: 52 MMHG | SYSTOLIC BLOOD PRESSURE: 104 MMHG

## 2020-12-17 VITALS — DIASTOLIC BLOOD PRESSURE: 62 MMHG | SYSTOLIC BLOOD PRESSURE: 119 MMHG

## 2020-12-17 VITALS — DIASTOLIC BLOOD PRESSURE: 64 MMHG | SYSTOLIC BLOOD PRESSURE: 116 MMHG

## 2020-12-17 VITALS — DIASTOLIC BLOOD PRESSURE: 59 MMHG | SYSTOLIC BLOOD PRESSURE: 111 MMHG

## 2020-12-17 LAB
ALBUMIN SERPL-MCNC: 2.3 G/DL (ref 3.4–5)
ALBUMIN/GLOB SERPL: 0.5 {RATIO} (ref 1–1.7)
ALP SERPL-CCNC: 108 U/L (ref 46–116)
ALT SERPL-CCNC: 24 U/L (ref 14–59)
ANION GAP SERPL CALC-SCNC: 10 MMOL/L (ref 6–14)
AST SERPL-CCNC: 39 U/L (ref 15–37)
BILIRUB SERPL-MCNC: 0.6 MG/DL (ref 0.2–1)
BUN/CREAT SERPL: 28 (ref 6–20)
CA-I SERPL ISE-MCNC: 28 MG/DL (ref 7–20)
CALCIUM SERPL-MCNC: 8.8 MG/DL (ref 8.5–10.1)
CHLORIDE SERPL-SCNC: 101 MMOL/L (ref 98–107)
CO2 SERPL-SCNC: 25 MMOL/L (ref 21–32)
CREAT SERPL-MCNC: 1 MG/DL (ref 0.6–1)
ERYTHROCYTE [DISTWIDTH] IN BLOOD BY AUTOMATED COUNT: 14.4 % (ref 11.5–14.5)
GFR SERPLBLD BASED ON 1.73 SQ M-ARVRAT: 53.5 ML/MIN
GLOBULIN SER-MCNC: 4.9 G/DL (ref 2.2–3.8)
GLUCOSE SERPL-MCNC: 123 MG/DL (ref 70–99)
HCT VFR BLD CALC: 46.4 % (ref 36–47)
HGB BLD-MCNC: 14.7 G/DL (ref 12–15.5)
MCH RBC QN AUTO: 29 PG (ref 25–35)
MCHC RBC AUTO-ENTMCNC: 32 G/DL (ref 31–37)
MCV RBC AUTO: 91 FL (ref 79–100)
PLATELET # BLD AUTO: 274 X10^3/UL (ref 140–400)
POTASSIUM SERPL-SCNC: 4 MMOL/L (ref 3.5–5.1)
PROT SERPL-MCNC: 7.2 G/DL (ref 6.4–8.2)
RBC # BLD AUTO: 5.12 X10^6/UL (ref 3.5–5.4)
SODIUM SERPL-SCNC: 136 MMOL/L (ref 136–145)
WBC # BLD AUTO: 4.5 X10^3/UL (ref 4–11)

## 2020-12-17 PROCEDURE — XW033E5 INTRODUCTION OF REMDESIVIR ANTI-INFECTIVE INTO PERIPHERAL VEIN, PERCUTANEOUS APPROACH, NEW TECHNOLOGY GROUP 5: ICD-10-PCS | Performed by: INTERNAL MEDICINE

## 2020-12-17 RX ADMIN — METOPROLOL TARTRATE SCH MG: 25 TABLET, FILM COATED ORAL at 20:20

## 2020-12-17 RX ADMIN — CETIRIZINE HYDROCHLORIDE SCH MG: 10 TABLET, FILM COATED ORAL at 08:37

## 2020-12-17 RX ADMIN — PANTOPRAZOLE SODIUM SCH MG: 40 TABLET, DELAYED RELEASE ORAL at 08:36

## 2020-12-17 RX ADMIN — AMIODARONE HYDROCHLORIDE SCH MG: 200 TABLET ORAL at 20:21

## 2020-12-17 RX ADMIN — ALBUTEROL SULFATE PRN PUFF: 90 AEROSOL, METERED RESPIRATORY (INHALATION) at 20:20

## 2020-12-17 RX ADMIN — SUCRALFATE SCH GM: 1 TABLET ORAL at 12:32

## 2020-12-17 RX ADMIN — IPRATROPIUM BROMIDE AND ALBUTEROL SCH PUFF: 20; 100 SPRAY, METERED RESPIRATORY (INHALATION) at 20:20

## 2020-12-17 RX ADMIN — AMIODARONE HYDROCHLORIDE SCH MG: 200 TABLET ORAL at 08:36

## 2020-12-17 RX ADMIN — BENZONATATE SCH MG: 100 CAPSULE, LIQUID FILLED ORAL at 20:20

## 2020-12-17 RX ADMIN — AZITHROMYCIN DIHYDRATE SCH MLS/HR: 500 INJECTION, POWDER, LYOPHILIZED, FOR SOLUTION INTRAVENOUS at 17:27

## 2020-12-17 RX ADMIN — METOPROLOL TARTRATE SCH MG: 25 TABLET, FILM COATED ORAL at 10:19

## 2020-12-17 RX ADMIN — LOPERAMIDE HCL PRN MG: 1 SUSPENSION ORAL at 12:30

## 2020-12-17 RX ADMIN — POTASSIUM CHLORIDE SCH MEQ: 1500 TABLET, EXTENDED RELEASE ORAL at 08:35

## 2020-12-17 RX ADMIN — SUCRALFATE SCH GM: 1 TABLET ORAL at 05:04

## 2020-12-17 RX ADMIN — SUCRALFATE SCH GM: 1 TABLET ORAL at 23:00

## 2020-12-17 RX ADMIN — ATORVASTATIN CALCIUM SCH MG: 20 TABLET, FILM COATED ORAL at 08:36

## 2020-12-17 RX ADMIN — ASPIRIN 81 MG SCH MG: 81 TABLET ORAL at 08:35

## 2020-12-17 RX ADMIN — DEXAMETHASONE SODIUM PHOSPHATE SCH MG: 10 INJECTION INTRAMUSCULAR; INTRAVENOUS at 08:36

## 2020-12-17 RX ADMIN — Medication SCH CAP: at 20:20

## 2020-12-17 RX ADMIN — SUCRALFATE SCH GM: 1 TABLET ORAL at 17:27

## 2020-12-17 RX ADMIN — Medication SCH CAP: at 08:36

## 2020-12-17 NOTE — PN
DATE:  12/17/2020



SUBJECTIVE:  The patient is resting, slightly propped up in bed, in no apparent

respiratory distress.  She denied any chest pain or shortness of breath.  She

continued to have cough with scanty whitish sputum with occasional flecks of

yellowish color.  Denied any hemoptysis.  Denied any hematemesis, melena, or

hematochezia.



PHYSICAL EXAMINATION:

GENERAL:  When I examined her, she looked well and was clearly in no apparent

respiratory distress.  No pallor, jaundice, cyanosis or thyromegaly.  No jugular

venous distention.  No limb edema.

VITAL SIGNS:  Her heart rate was 78, blood pressure was 104/52, temperature

97.2, respiratory rate was 16, and oxygen saturation was 94% on 3 liters of

oxygen.

HEAD, EYES, EARS, NOSE AND THROAT:  Showed normocephalic, atraumatic.

NECK:  Supple.

HEART:  Showed normal first and second heart sounds.  No gallop, rub or murmur.

CHEST:  Clear to auscultation.  No crepitation or rhonchi.

ABDOMEN:  Scaphoid, soft, nontender.

NEUROLOGIC:  She is very hard of hearing, but otherwise all cranial nerves are

intact.  She moves extremities without difficulty.



Her intake over the last 24 hours was incompletely recorded.



LABORATORY DATA:  Her lab work this morning showed a white cell count 4500,

hemoglobin 14.7, hematocrit 46, MCV 91, and platelet count 247,000.  Her serum

sodium was 136, potassium 4, chloride 101, bicarbonate 25, anion gap of 10, BUN

28, creatinine 1, estimated GFR was 53 mL per minute.  Her glucose was 123,

calcium was 8.8.  Total bilirubin, ALT, alkaline phosphatase normal.  AST

slightly elevated.  Her beta natriuretic peptide was 2162.  Total protein 7.2,

albumin was 2.3.  Her prothrombin time was 67.7, INR of 6.5.  Urinalysis was

essentially unremarkable and unfortunately, her coronavirus-2 PCR was

detectable.  Her influenza A and B were negative.



ASSESSMENT:

1.  COVID-19 pneumonia versus community-acquired pneumonia.

2.  Acute hypoxic respiratory failure.

3.  Chronic atrial fibrillation with rapid ventricular response.  She has

hyponatremia and hypokalemia that has resolved.  Her serum sodium is up to 136,

potassium 4, Coumadin-induced coagulopathy.  Coumadin is on hold.  However,

after we started her on IV antibiotic, her INR has risen further, so we will

treat her with 2 mg of vitamin K, apparently the patient is scheduled for

outpatient cardioversion next week, has had a Watchman placed last month;

however, she continues to be on Coumadin.



PLAN:  Obviously to continue holding Coumadin.  Continue with Combivent Respimat

1 puff for 4 times a day.  Continue with metoprolol 12.5 mg twice a day. 

Continue with antibiotic in the form of ceftriaxone as well as azithromycin. 

Continue with dexamethasone.  Continue with remdesivir.





______________________________

LAURA COLUNGA MD



DR:  CEDRIC/deedee  JOB#:  [id number] / [id number]

DD:  12/17/2020 17:41  DT:  12/17/2020 21:32

## 2020-12-17 NOTE — PDOC2
CARDIAC CONSULT


DATE OF CONSULT


DOS:


DATE: 12/17/20 


TIME: 07:32





REASON FOR CONSULT


Reason for Consult


AFIB/ flutter





REFERRING PHYSICIAN


Referring Physician


Dr. Combs





SOURCE


Source:  Chart review, Patient





HPI


History of Present Illness


This is a 81 yo female who presented secondary to shortness of breath, cough, 

and fatigue. Symptoms present for the last week. She was note in AFIB with RVR 

which prompted this consult. Was given IV digoxin and rate has been controlled. 

She follows with  cardiology. Is scheduled for outpatient CV next week. Had 

Watchman placed last month.





PAST MEDICAL HISTORY


Cardiovascular:  AFIB, HTN, hyperipidemia


GI:  GERD


Heme/Onc:  Other (GIB)





PAST SURGICAL HISTORY


Past Surgical History:  Other (Watchman, Nissen fundoplication )





FAMILY HISTORY


Family History:  Cancer (lung )





SOCIAL HISTORY


Smoke:  Quit


ALCOHOL:  none


Drugs:  None


Lives:  with Family





CURRENT MEDICATIONS


Current Medications





Current Medications


Ceftriaxone Sodium 1 gm/ Sodium Chloride 50 ml @  100 mls/hr 1X  ONCE IV  Last 

administered on 12/15/20at 20:16;  Start 12/15/20 at 17:45;  Stop 12/15/20 at 

18:14;  Status DC


Potassium Chloride (Klor-Con) 40 meq 1X  ONCE PO  Last administered on 12/1 5/20at 20:17;  Start 12/15/20 at 17:45;  Stop 12/15/20 at 17:46;  Status DC


Lactobacillus Rhamnosus (Culturelle) 1 cap BID PO  Last administered on 

12/16/20at 20:11;  Start 12/16/20 at 09:00


Amiodarone HCl (Cordarone) 200 mg BID PO  Last administered on 12/16/20at 20:12;

 Start 12/16/20 at 11:15


Aspirin (Aspirin Chewable) 81 mg DAILY PO  Last administered on 12/16/20at 

12:03;  Start 12/16/20 at 11:15


Atorvastatin Calcium (Lipitor) 20 mg DAILY PO  Last administered on 12/16/20at 

12:04;  Start 12/16/20 at 11:15


Loperamide HCl (Immodium Oral Susp) 2 mg PRN DAILY  PRN PO DIARRHEA Last 

administered on 12/16/20at 12:17;  Start 12/16/20 at 11:00


Lorazepam (Ativan) 0.5 mg PRN Q8HRS  PRN PO ANXIETY / AGITATION;  Start 12/16/20

at 11:00


Potassium Chloride (Klor-Con) 20 meq DAILY PO  Last administered on 12/16/20at 

12:04;  Start 12/16/20 at 11:15


Sucralfate (Carafate) 1 gm [id number] PO  Last administered on 12/17/20at 05:04; 

Start 12/16/20 at 12:00


Hydrochlorothiazide (Microzide) 12.5 mg DAILY PO  Last administered on 

12/16/20at 12:04;  Start 12/16/20 at 11:15;  Stop 12/16/20 at 16:29;  Status DC


Cetirizine HCl (ZyrTEC) 10 mg DAILY PO  Last administered on 12/16/20at 12:04;  

Start 12/16/20 at 11:15


Pantoprazole Sodium (Protonix) 40 mg DAILYAC PO ;  Start 12/17/20 at 07:30


Ceftriaxone Sodium 1 gm/ Sodium Chloride 50 ml @  100 mls/hr Q24H IV  Last 

administered on 12/16/20at 16:57;  Start 12/16/20 at 18:00


Azithromycin 500 mg/Sodium Chloride 250 ml @  250 mls/hr 1X  ONCE IV ;  Start 

12/16/20 at 16:30;  Stop 12/16/20 at 16:42;  Status DC


Dexamethasone Sodium Phosphate (Decadron) 6 mg DAILY IV  Last administered on 

12/16/20at 16:57;  Start 12/16/20 at 16:30


Potassium Chloride (Klor-Con) 40 meq 1X  ONCE PO  Last administered on 

12/16/20at 16:58;  Start 12/16/20 at 16:30;  Stop 12/16/20 at 16:33;  Status DC


Azithromycin 500 mg/Sodium Chloride 250 ml @  250 mls/hr Q24H IV  Last 

administered on 12/16/20at 16:57;  Start 12/16/20 at 17:00


Digoxin (Lanoxin) 500 mcg 1X  ONCE IV  Last administered on 12/16/20at 18:40;  

Start 12/16/20 at 18:30;  Stop 12/16/20 at 18:31;  Status DC





Active Scripts


Active


Reported


Doxycycline Hyclate 100 Mg Tablet.dr 100 Mg PO BID


Klor-Con M20 (Potassium Chloride) 20 Meq Tab.er.prt 20 Meq PO DAILY


Osteo Bi-Flex Caplet (Glucosamine Hcl/Chondr Hsu A Na) 1 Each Tablet 1 Each PO 

DAILY


Imodium A-D (Loperamide Hcl) 1 Mg/7.5 Ml Liquid 2 Mg PO PRN DAILY PRN


Pacerone (Amiodarone Hcl) 200 Mg Tablet 200 Mg PO BID


Atorvastatin Calcium 20 Mg Tablet 20 Mg PO DAILY


Hydrochlorothiazide Tablet (Hydrochlorothiazide) 12.5 Mg Tablet 12.5 Mg PO DAILY


Coumadin (Warfarin Sodium) 5 Mg Tablet 5 Mg PO DAILY


Carafate (Sucralfate) 1 Gm Tablet 1 Gm PO [id number]


Prilosec (Omeprazole) 20 Mg Capsule.dr 20 Mg PO BIDBFRMEAL


Cozaar (Losartan Potassium) 100 Mg Tablet 100 Mg PO DAILY


Ativan ** (Lorazepam) 0.5 Mg Tablet 0.5 Mg PO PRN Q8HRS PRN


Atrovent (Ipratropium Bromide) 15 Ml Spray 42 NS PRN DAILY


Claritin (Loratadine) 10 Mg Tablet 10 Mg PO DAILY


Aspirin 81 Mg Tab.chew 81 Mg PO DAILY


Norvasc (Amlodipine Besylate) 5 Mg Tablet 5 Mg PO BID





ALLERGIES


Allergies:  


Coded Allergies:  


     No Known Drug Allergies (Unverified , 12/15/20)





ROS


Review of Systems


14 point ROS conducted with pertinent positives noted above in HPI





PHYSICAL EXAM


General:  Alert, Oriented X3, Cooperative, No acute distress


HEENT:  Atraumatic, Mucous membr. moist/pink


Lungs:  Other (NC)


Heart:  Other (AFIB, rate controlled )


Extremities:  No edema, Normal pulses


Skin:  No breakdown


Neuro:  Normal speech, Sensation intact


Psych/Mental Status:  Mental status NL, Mood NL


MUSCULOSKELETAL:  Osteoarthritic changes both hands





VITALS


Vital Signs





Vital Signs








  Date Time  Temp Pulse Resp B/P (MAP) Pulse Ox O2 Delivery O2 Flow Rate FiO2


 


12/17/20 06:00 97.4 85 16 111/59 (76) 93 Nasal Cannula 3.0 











LABS


LABS





Laboratory Tests








Test


 12/15/20


15:21 12/15/20


15:22 12/15/20


15:44 12/15/20


21:15


 


White Blood Count


 6.6 x10^3/uL


(4.0-11.0) 


 


 





 


Red Blood Count


 4.66 x10^6/uL


(3.50-5.40) 


 


 





 


Hemoglobin


 13.5 g/dL


(12.0-15.5) 


 


 





 


Hematocrit


 41.6 %


(36.0-47.0) 


 


 





 


Mean Corpuscular Volume 89 fL ()    


 


Mean Corpuscular Hemoglobin 29 pg (25-35)    


 


Mean Corpuscular Hemoglobin


Concent 32 g/dL


(31-37) 


 


 





 


Red Cell Distribution Width


 14.1 %


(11.5-14.5) 


 


 





 


Platelet Count


 213 x10^3/uL


(140-400) 


 


 





 


Neutrophils (%) (Auto) 84 % (31-73)    


 


Lymphocytes (%) (Auto) 9 % (24-48)    


 


Monocytes (%) (Auto) 8 % (0-9)    


 


Eosinophils (%) (Auto) 0 % (0-3)    


 


Basophils (%) (Auto) 0 % (0-3)    


 


Neutrophils # (Auto)


 5.5 x10^3uL


(1.8-7.7) 


 


 





 


Lymphocytes # (Auto)


 0.6 x10^3/uL


(1.0-4.8) 


 


 





 


Monocytes # (Auto)


 0.5 x10^3/uL


(0.0-1.1) 


 


 





 


Eosinophils # (Auto)


 0.0 x10^3/uL


(0.0-0.7) 


 


 





 


Basophils # (Auto)


 0.0 x10^3/uL


(0.0-0.2) 


 


 





 


Prothrombin Time


 44.7 SEC


(9.4-11.4) 


 


 





 


Prothromb Time International


Ratio 4.6 (0.9-1.1) 


 


 


 





 


Activated Partial


Thromboplast Time 50 SEC (23-33) 


 


 


 





 


Sodium Level


 131 mmol/L


(136-145) 


 


 





 


Potassium Level


 3.3 mmol/L


(3.5-5.1) 


 


 





 


Chloride Level


 96 mmol/L


() 


 


 





 


Carbon Dioxide Level


 27 mmol/L


(21-32) 


 


 





 


Anion Gap 8 (6-14)    


 


Blood Urea Nitrogen


 29 mg/dL


(7-20) 


 


 





 


Creatinine


 0.9 mg/dL


(0.6-1.0) 


 


 





 


Estimated GFR


(Cockcroft-Gault) 60.4 


 


 


 





 


Glucose Level


 106 mg/dL


(70-99) 


 


 





 


Lactic Acid Level


 1.3 mmol/L


(0.4-2.0) 


 


 





 


Calcium Level


 8.5 mg/dL


(8.5-10.1) 


 


 





 


Creatine Kinase


 116 U/L


() 


 


 





 


Troponin I Quantitative


 < 0.017 ng/mL


(0-0.055) 


 


 < 0.017 ng/mL


(0-0.055)


 


NT-Pro-B-Type Natriuretic


Peptide 1794 pg/mL


(0-449) 


 


 





 


Influenza Type A (Rapid)


 


 Negative


(NEGATIVE) 


 





 


Influenza Type B (Rapid)


 


 Negative


(NEGATIVE) 


 





 


Coronavirus (PCR)


 


 


 Detected (Not


Detected) 





 


Test


 12/15/20


23:30 12/16/20


15:30 12/17/20


05:30 





 


Urine Collection Type Unknown    


 


Urine Color Yellow    


 


Urine Clarity Clear    


 


Urine pH 6.0    


 


Urine Specific Gravity >=1.030    


 


Urine Protein


 30 mg/dl


(NEG-TRACE) 


 


 





 


Urine Glucose (UA)


 Neg mg/dL


(NEG) 


 


 





 


Urine Ketones (Stick) 15 mg/dL (NEG)    


 


Urine Blood Neg (NEG)    


 


Urine Nitrite Neg (NEG)    


 


Urine Bilirubin Neg (NEG)    


 


Urine Urobilinogen Dipstick


 1.0 mg/dL (0.2


mg/dL) 


 


 





 


Urine Leukocyte Esterase Trace (NEG)    


 


Urine RBC 0 /HPF (0-2)    


 


Urine WBC


 5-10 /HPF


(0-4) 


 


 





 


Urine Squamous Epithelial


Cells Occ /LPF 


 


 


 





 


Urine Bacteria


 Few /HPF


(0-FEW) 


 


 





 


Troponin I Quantitative


 < 0.017 ng/mL


(0-0.055) 


 


 





 


Prothrombin Time


 


 40.9 SEC


(9.4-11.4) 


 





 


Prothromb Time International


Ratio 


 4.2 (0.9-1.1) 


 


 





 


Sodium Level


 


 


 136 mmol/L


(136-145) 





 


Potassium Level


 


 


 4.0 mmol/L


(3.5-5.1) 





 


Chloride Level


 


 


 101 mmol/L


() 





 


Carbon Dioxide Level


 


 


 25 mmol/L


(21-32) 





 


Anion Gap   10 (6-14)  


 


Blood Urea Nitrogen


 


 


 28 mg/dL


(7-20) 





 


Creatinine


 


 


 1.0 mg/dL


(0.6-1.0) 





 


Estimated GFR


(Cockcroft-Gault) 


 


 53.5 


 





 


BUN/Creatinine Ratio   28 (6-20)  


 


Glucose Level


 


 


 123 mg/dL


(70-99) 





 


Calcium Level


 


 


 8.8 mg/dL


(8.5-10.1) 





 


Total Bilirubin


 


 


 0.6 mg/dL


(0.2-1.0) 





 


Aspartate Amino Transf


(AST/SGOT) 


 


 39 U/L (15-37) 


 





 


Alanine Aminotransferase


(ALT/SGPT) 


 


 24 U/L (14-59) 


 





 


Alkaline Phosphatase


 


 


 108 U/L


() 





 


NT-Pro-B-Type Natriuretic


Peptide 


 


 2162 pg/mL


(0-449) 





 


Total Protein


 


 


 7.2 g/dL


(6.4-8.2) 





 


Albumin


 


 


 2.3 g/dL


(3.4-5.0) 





 


Albumin/Globulin Ratio   0.5 (1.0-1.7)  











ECHOCARDIOGRAM


Echocardiogram


11/3/20 -  LIMITED ECHO





Qualitatively normal LV size and systolic function, EF ~ 65%.  Diastolic 

function not assessed


No regional wall motion abnormalities


Qualitatively normal RV size and function


Qualitatively moderate to severe biatrial enlargement


Mitral annular calcification without stenosis.  Regurgitation not assessed


Mild aortic sclerosis without stenosis.


There is a loss of effacement of the normal sinotubular junction contour. The 

aortic dimensions are qualitatively within normal limits.


No pericardial effusion 


 


Compared with the prior resting portion of the exercise echo/Doppler study on 

11-13-19, there have been no significant interval changes in 2D only structure 

or function.





ASSESSMENT/PLAN


Assessment/Plan


1.  Acute respiratory failure secondary to COVID


2.  Paroxysmal AFIB; s/p previous CV. Back in AFIB. Is scheduled for outpatient 

CV next week with [facility]. Recent echo with preserved LV systolic function


3.  H/o sinus bradycardia; prevented aggressive use of AV srinivasa blocking agents 

in past


4.  S/p LAAO with Watchman; 11/2/20. Still on warfarin therapy. INR 

supratherapeutic upon arrival 


5.  H/o GIB


6.  Hypertension; low end 


7.  Hyperlipidemia; statin 


 








Recommendations





Continue Amiodarone therapy


Will add low-dose metoprolol for rate control. Monitor for bradycardia.


Hold losartan, amlodipine with low end BP


Warfarin on hold with coagulopathy 


Ongoing lung optimization, treatment of COVID


Follow up with [facility] upon discharge











AUGUSTIN PICKERING           Dec 17, 2020 07:37

## 2020-12-18 VITALS — DIASTOLIC BLOOD PRESSURE: 49 MMHG | SYSTOLIC BLOOD PRESSURE: 111 MMHG

## 2020-12-18 VITALS — SYSTOLIC BLOOD PRESSURE: 95 MMHG | DIASTOLIC BLOOD PRESSURE: 42 MMHG

## 2020-12-18 VITALS — DIASTOLIC BLOOD PRESSURE: 57 MMHG | SYSTOLIC BLOOD PRESSURE: 107 MMHG

## 2020-12-18 VITALS — SYSTOLIC BLOOD PRESSURE: 114 MMHG | DIASTOLIC BLOOD PRESSURE: 56 MMHG

## 2020-12-18 VITALS — DIASTOLIC BLOOD PRESSURE: 66 MMHG | SYSTOLIC BLOOD PRESSURE: 111 MMHG

## 2020-12-18 LAB
ALBUMIN SERPL-MCNC: 2 G/DL (ref 3.4–5)
ALBUMIN/GLOB SERPL: 0.5 {RATIO} (ref 1–1.7)
ALP SERPL-CCNC: 87 U/L (ref 46–116)
ALT SERPL-CCNC: 21 U/L (ref 14–59)
ANION GAP SERPL CALC-SCNC: 9 MMOL/L (ref 6–14)
AST SERPL-CCNC: 34 U/L (ref 15–37)
BILIRUB SERPL-MCNC: 0.3 MG/DL (ref 0.2–1)
BUN/CREAT SERPL: 38 (ref 6–20)
CA-I SERPL ISE-MCNC: 38 MG/DL (ref 7–20)
CALCIUM SERPL-MCNC: 8.4 MG/DL (ref 8.5–10.1)
CHLORIDE SERPL-SCNC: 103 MMOL/L (ref 98–107)
CO2 SERPL-SCNC: 26 MMOL/L (ref 21–32)
CREAT SERPL-MCNC: 1 MG/DL (ref 0.6–1)
ERYTHROCYTE [DISTWIDTH] IN BLOOD BY AUTOMATED COUNT: 14 % (ref 11.5–14.5)
GFR SERPLBLD BASED ON 1.73 SQ M-ARVRAT: 53.5 ML/MIN
GLOBULIN SER-MCNC: 4.1 G/DL (ref 2.2–3.8)
GLUCOSE SERPL-MCNC: 125 MG/DL (ref 70–99)
HCT VFR BLD CALC: 41.8 % (ref 36–47)
HGB BLD-MCNC: 13.3 G/DL (ref 12–15.5)
MCH RBC QN AUTO: 29 PG (ref 25–35)
MCHC RBC AUTO-ENTMCNC: 32 G/DL (ref 31–37)
MCV RBC AUTO: 90 FL (ref 79–100)
PLATELET # BLD AUTO: 301 X10^3/UL (ref 140–400)
POTASSIUM SERPL-SCNC: 4.2 MMOL/L (ref 3.5–5.1)
PROT SERPL-MCNC: 6.1 G/DL (ref 6.4–8.2)
RBC # BLD AUTO: 4.65 X10^6/UL (ref 3.5–5.4)
SODIUM SERPL-SCNC: 138 MMOL/L (ref 136–145)
WBC # BLD AUTO: 11.8 X10^3/UL (ref 4–11)

## 2020-12-18 RX ADMIN — ATORVASTATIN CALCIUM SCH MG: 20 TABLET, FILM COATED ORAL at 08:32

## 2020-12-18 RX ADMIN — BENZONATATE SCH MG: 100 CAPSULE, LIQUID FILLED ORAL at 20:16

## 2020-12-18 RX ADMIN — IPRATROPIUM BROMIDE AND ALBUTEROL SCH PUFF: 20; 100 SPRAY, METERED RESPIRATORY (INHALATION) at 20:16

## 2020-12-18 RX ADMIN — IPRATROPIUM BROMIDE AND ALBUTEROL SCH PUFF: 20; 100 SPRAY, METERED RESPIRATORY (INHALATION) at 15:13

## 2020-12-18 RX ADMIN — CETIRIZINE HYDROCHLORIDE SCH MG: 10 TABLET, FILM COATED ORAL at 08:33

## 2020-12-18 RX ADMIN — IPRATROPIUM BROMIDE AND ALBUTEROL SCH PUFF: 20; 100 SPRAY, METERED RESPIRATORY (INHALATION) at 11:53

## 2020-12-18 RX ADMIN — POTASSIUM CHLORIDE SCH MEQ: 1500 TABLET, EXTENDED RELEASE ORAL at 08:34

## 2020-12-18 RX ADMIN — Medication SCH CAP: at 20:16

## 2020-12-18 RX ADMIN — METOPROLOL TARTRATE SCH MG: 25 TABLET, FILM COATED ORAL at 08:32

## 2020-12-18 RX ADMIN — REMDESIVIR SCH MLS/HR: 100 INJECTION, POWDER, LYOPHILIZED, FOR SOLUTION INTRAVENOUS at 09:51

## 2020-12-18 RX ADMIN — BENZONATATE SCH MG: 100 CAPSULE, LIQUID FILLED ORAL at 08:33

## 2020-12-18 RX ADMIN — SUCRALFATE SCH GM: 1 TABLET ORAL at 11:53

## 2020-12-18 RX ADMIN — Medication SCH CAP: at 08:32

## 2020-12-18 RX ADMIN — AMIODARONE HYDROCHLORIDE SCH MG: 200 TABLET ORAL at 20:17

## 2020-12-18 RX ADMIN — LOPERAMIDE HCL PRN MG: 1 SUSPENSION ORAL at 12:30

## 2020-12-18 RX ADMIN — METOPROLOL TARTRATE SCH MG: 25 TABLET, FILM COATED ORAL at 20:18

## 2020-12-18 RX ADMIN — AZITHROMYCIN DIHYDRATE SCH MLS/HR: 500 INJECTION, POWDER, LYOPHILIZED, FOR SOLUTION INTRAVENOUS at 16:46

## 2020-12-18 RX ADMIN — IPRATROPIUM BROMIDE AND ALBUTEROL SCH PUFF: 20; 100 SPRAY, METERED RESPIRATORY (INHALATION) at 08:34

## 2020-12-18 RX ADMIN — SUCRALFATE SCH GM: 1 TABLET ORAL at 04:51

## 2020-12-18 RX ADMIN — SUCRALFATE SCH GM: 1 TABLET ORAL at 23:05

## 2020-12-18 RX ADMIN — DEXAMETHASONE SODIUM PHOSPHATE SCH MG: 10 INJECTION INTRAMUSCULAR; INTRAVENOUS at 08:33

## 2020-12-18 RX ADMIN — SUCRALFATE SCH GM: 1 TABLET ORAL at 18:00

## 2020-12-18 RX ADMIN — BENZONATATE SCH MG: 100 CAPSULE, LIQUID FILLED ORAL at 14:00

## 2020-12-18 RX ADMIN — AMIODARONE HYDROCHLORIDE SCH MG: 200 TABLET ORAL at 08:33

## 2020-12-18 RX ADMIN — ASPIRIN 81 MG SCH MG: 81 TABLET ORAL at 08:33

## 2020-12-18 RX ADMIN — PANTOPRAZOLE SODIUM SCH MG: 40 TABLET, DELAYED RELEASE ORAL at 08:33

## 2020-12-18 NOTE — PDOC
DATE OF SERVICE:


DOS:


DATE: 12/18/20 


TIME: 18:44





SUBJECTIVE:


Patient seen and evaluated.





OBJECTIVE:


Problems:


Problems


Medical Problems:


(1) Atrial fibrillation


Status: Acute  





(2) Hypoxia


Status: Acute  





(3) Person under investigation for COVID-19


Status: Acute  





(4) Pneumonia


Status: Acute  





(5) Warfarin anticoagulation


Status: Acute  





Acute respiratory failure secondary to COVID.  Continuing baseline COVID 

treatment.


Paroxysmal AFIB; s/p previous CV. Back in AFIB. Is scheduled for outpatient CV 

next week with [facility]. Recent echo with preserved LV systolic function


H/o sinus bradycardia; Rate OK on low dose beta blocker.


S/p LAAO with Watchman; 11/2/20. Still on warfarin therapy. INR supratherapeutic

upon arrival.  Adjust warfarin as per lab results.


H/o GIB


Hypertension; improved.


Hyperlipidemia; statin


Vital Signs/I&O:





                                   Vital Signs








  Date Time  Temp Pulse Resp B/P (MAP) Pulse Ox O2 Delivery O2 Flow Rate FiO2


 


12/18/20 16:22 96.5 85 26 111/49 (69) 94 Nasal Cannula 4.0 














                                    I & O   


 


 12/17/20 12/17/20 12/18/20





 15:00 23:00 07:00


 


Intake Total 490 ml 760 ml 200 ml


 


Output Total 1 ml  


 


Balance 489 ml 760 ml 200 ml








Labs:





                                Laboratory Tests








Test


 12/18/20


05:05 12/18/20


14:00


 


White Blood Count


 11.8 x10^3/uL


(4.0-11.0)  H 





 


Red Blood Count


 4.65 x10^6/uL


(3.50-5.40) 





 


Hemoglobin


 13.3 g/dL


(12.0-15.5) 





 


Hematocrit


 41.8 %


(36.0-47.0) 





 


Mean Corpuscular Volume


 90 fL ()


 





 


Mean Corpuscular Hemoglobin 29 pg (25-35)   


 


Mean Corpuscular Hemoglobin


Concent 32 g/dL


(31-37) 





 


Red Cell Distribution Width


 14.0 %


(11.5-14.5) 





 


Platelet Count


 301 x10^3/uL


(140-400) 





 


Sodium Level


 138 mmol/L


(136-145) 





 


Potassium Level


 4.2 mmol/L


(3.5-5.1) 





 


Chloride Level


 103 mmol/L


() 





 


Carbon Dioxide Level


 26 mmol/L


(21-32) 





 


Anion Gap 9 (6-14)   


 


Blood Urea Nitrogen


 38 mg/dL


(7-20)  H 





 


Creatinine


 1.0 mg/dL


(0.6-1.0) 





 


Estimated GFR


(Cockcroft-Gault) 53.5  


 





 


BUN/Creatinine Ratio 38 (6-20)  H 


 


Glucose Level


 125 mg/dL


(70-99)  H 





 


Calcium Level


 8.4 mg/dL


(8.5-10.1)  L 





 


Total Bilirubin


 0.3 mg/dL


(0.2-1.0) 





 


Aspartate Amino Transferase


(AST) 34 U/L (15-37)


 





 


Alanine Aminotransferase (ALT)


 21 U/L (14-59)


 





 


Alkaline Phosphatase


 87 U/L


() 





 


Total Protein


 6.1 g/dL


(6.4-8.2)  L 





 


Albumin


 2.0 g/dL


(3.4-5.0)  L 





 


Albumin/Globulin Ratio


 0.5 (1.0-1.7)


L 





 


Prothrombin Time


 


 67.3 SEC


(9.4-11.4)  H


 


Prothrombin Time INR


 


 6.5 (0.9-1.1)


*H











Justification of Admission:


Justification of Admission:


Justification of Admission Dx:  Yes


Respiratory Failure:  Severe Resp Distress











ERNST RODRIGUEZ MD            Dec 18, 2020 18:48

## 2020-12-18 NOTE — PN
DATE:  12/18/2020



SUBJECTIVE:  The patient is resting slightly propped up in bed, no apparent

distress.  She continued to complain of cough, which is mostly productive. 

Denied any chest pain, denied any orthopnea or paroxysmal nocturnal dyspnea. 

Did complain of insomnia and said she could not sleep last night.



PHYSICAL EXAMINATION:

GENERAL:  When I saw her this morning, she was somewhat cachectic, but no

jaundice, cyanosis or thyromegaly.  No jugular venous distention or limb edema.

VITAL SIGNS:  Her heart rate was 79, blood pressure was 95/42, temperature was

98.4, respiratory rate was 19 and oxygen saturation was 94% on 4 liters of

oxygen.

HEAD, EYES, EARS, NOSE, AND THROAT:  Showed normocephalic, atraumatic.

NECK:  Supple.

HEART:  Normal first and second sounds.  No gallop, rub or murmur.

CHEST:  Shows central trachea, equally reduced expansion, reduced air entry with

bilateral basal crepitation.  Posteriorly, I could not appreciate any rhonchi.

ABDOMEN:  Scaphoid, soft, nontender.

NEUROLOGIC:  She is somewhat hard of hearing, but otherwise all her cranial

nerves are intact.  She moves extremities without difficulty.  She ambulates

without assistance or assistive devices.



Her intake was 1670, output was 350.



LABORATORY DATA:  As of this morning, her white cell count was 11,800,

hemoglobin 13, hematocrit 41, MCV 90, and platelet count 301,000.  Her chemistry

showed a serum sodium of 138, potassium 4.3, chloride 103, bicarbonate 26, anion

gap of 9, BUN 38, creatinine 1, estimated GFR was 53 mL per minute.  Her glucose

125, calcium was 8.4.  Total bilirubin, AST, ALT, alkaline phosphatase were

normal.  Total protein was 6.1, albumin was 2.  Her prothrombin time and INR

still pending at the time of this dictation.  Her coronavirus by PCR was

detectable; however, influenza A and B were negative.



ASSESSMENT:

1.  COVID-19 pneumonia.

2.  Acute hypoxic respiratory failure.

3.  The patient has multiple other medical problems including chronic atrial

fibrillation with rapid ventricular response, rate much better controlled now,

hypertension, gastroesophageal reflux disease as well as gout.



PLAN:  To continue with current plan of management.  I will continue obviously

with ceftriaxone and Zithromax.  Continue with remdesivir, dexamethasone,

continue with all her other medications.  I did order stat PT/INR to make sure

that her INR is within therapeutic range.  She did have Coumadin-induced

coagulopathy and we gave her 2 mg of vitamin K yesterday.





______________________________

LAURA COLUNGA MD



DR:  CEDRIC/deedee  JOB#:  [id number] / [id number]

DD:  12/18/2020 14:41  DT:  12/18/2020 20:32

## 2020-12-19 VITALS — SYSTOLIC BLOOD PRESSURE: 94 MMHG | DIASTOLIC BLOOD PRESSURE: 40 MMHG

## 2020-12-19 VITALS — DIASTOLIC BLOOD PRESSURE: 53 MMHG | SYSTOLIC BLOOD PRESSURE: 103 MMHG

## 2020-12-19 VITALS — SYSTOLIC BLOOD PRESSURE: 122 MMHG | DIASTOLIC BLOOD PRESSURE: 67 MMHG

## 2020-12-19 VITALS — DIASTOLIC BLOOD PRESSURE: 52 MMHG | SYSTOLIC BLOOD PRESSURE: 114 MMHG

## 2020-12-19 VITALS — SYSTOLIC BLOOD PRESSURE: 104 MMHG | DIASTOLIC BLOOD PRESSURE: 49 MMHG

## 2020-12-19 VITALS — DIASTOLIC BLOOD PRESSURE: 54 MMHG | SYSTOLIC BLOOD PRESSURE: 110 MMHG

## 2020-12-19 LAB
ANION GAP SERPL CALC-SCNC: 6 MMOL/L (ref 6–14)
BASOPHILS # BLD AUTO: 0 X10^3/UL (ref 0–0.2)
BASOPHILS NFR BLD: 0 % (ref 0–3)
CA-I SERPL ISE-MCNC: 33 MG/DL (ref 7–20)
CALCIUM SERPL-MCNC: 7.9 MG/DL (ref 8.5–10.1)
CHLORIDE SERPL-SCNC: 109 MMOL/L (ref 98–107)
CO2 SERPL-SCNC: 26 MMOL/L (ref 21–32)
CREAT SERPL-MCNC: 1 MG/DL (ref 0.6–1)
EOSINOPHIL NFR BLD: 0 % (ref 0–3)
EOSINOPHIL NFR BLD: 0 X10^3/UL (ref 0–0.7)
ERYTHROCYTE [DISTWIDTH] IN BLOOD BY AUTOMATED COUNT: 14.5 % (ref 11.5–14.5)
GFR SERPLBLD BASED ON 1.73 SQ M-ARVRAT: 53.5 ML/MIN
GLUCOSE SERPL-MCNC: 94 MG/DL (ref 70–99)
HCT VFR BLD CALC: 40 % (ref 36–47)
HGB BLD-MCNC: 12.7 G/DL (ref 12–15.5)
LYMPHOCYTES # BLD: 1 X10^3/UL (ref 1–4.8)
LYMPHOCYTES NFR BLD AUTO: 8 % (ref 24–48)
MCH RBC QN AUTO: 29 PG (ref 25–35)
MCHC RBC AUTO-ENTMCNC: 32 G/DL (ref 31–37)
MCV RBC AUTO: 90 FL (ref 79–100)
MONO #: 0.7 X10^3/UL (ref 0–1.1)
MONOCYTES NFR BLD: 6 % (ref 0–9)
NEUT #: 10 X10^3UL (ref 1.8–7.7)
NEUTROPHILS NFR BLD AUTO: 86 % (ref 31–73)
PLATELET # BLD AUTO: 321 X10^3/UL (ref 140–400)
POTASSIUM SERPL-SCNC: 4.1 MMOL/L (ref 3.5–5.1)
RBC # BLD AUTO: 4.45 X10^6/UL (ref 3.5–5.4)
SODIUM SERPL-SCNC: 141 MMOL/L (ref 136–145)
WBC # BLD AUTO: 11.7 X10^3/UL (ref 4–11)

## 2020-12-19 RX ADMIN — METOPROLOL TARTRATE SCH MG: 25 TABLET, FILM COATED ORAL at 09:07

## 2020-12-19 RX ADMIN — SUCRALFATE SCH GM: 1 TABLET ORAL at 13:17

## 2020-12-19 RX ADMIN — Medication SCH CAP: at 09:07

## 2020-12-19 RX ADMIN — DEXAMETHASONE SODIUM PHOSPHATE SCH MG: 10 INJECTION INTRAMUSCULAR; INTRAVENOUS at 09:09

## 2020-12-19 RX ADMIN — LOPERAMIDE HCL PRN MG: 1 SUSPENSION ORAL at 09:24

## 2020-12-19 RX ADMIN — METOPROLOL TARTRATE SCH MG: 25 TABLET, FILM COATED ORAL at 21:00

## 2020-12-19 RX ADMIN — AMIODARONE HYDROCHLORIDE SCH MG: 200 TABLET ORAL at 21:56

## 2020-12-19 RX ADMIN — IPRATROPIUM BROMIDE AND ALBUTEROL SCH PUFF: 20; 100 SPRAY, METERED RESPIRATORY (INHALATION) at 08:00

## 2020-12-19 RX ADMIN — BENZONATATE SCH MG: 100 CAPSULE, LIQUID FILLED ORAL at 09:08

## 2020-12-19 RX ADMIN — SUCRALFATE SCH GM: 1 TABLET ORAL at 05:34

## 2020-12-19 RX ADMIN — IPRATROPIUM BROMIDE AND ALBUTEROL SCH PUFF: 20; 100 SPRAY, METERED RESPIRATORY (INHALATION) at 13:17

## 2020-12-19 RX ADMIN — CETIRIZINE HYDROCHLORIDE SCH MG: 10 TABLET, FILM COATED ORAL at 09:08

## 2020-12-19 RX ADMIN — ASPIRIN 81 MG SCH MG: 81 TABLET ORAL at 09:07

## 2020-12-19 RX ADMIN — BENZONATATE SCH MG: 100 CAPSULE, LIQUID FILLED ORAL at 13:17

## 2020-12-19 RX ADMIN — Medication SCH EA: at 09:16

## 2020-12-19 RX ADMIN — POTASSIUM CHLORIDE SCH MEQ: 1500 TABLET, EXTENDED RELEASE ORAL at 09:00

## 2020-12-19 RX ADMIN — SUCRALFATE SCH GM: 1 TABLET ORAL at 18:21

## 2020-12-19 RX ADMIN — IPRATROPIUM BROMIDE AND ALBUTEROL SCH PUFF: 20; 100 SPRAY, METERED RESPIRATORY (INHALATION) at 16:00

## 2020-12-19 RX ADMIN — AZITHROMYCIN DIHYDRATE SCH MLS/HR: 500 INJECTION, POWDER, LYOPHILIZED, FOR SOLUTION INTRAVENOUS at 18:21

## 2020-12-19 RX ADMIN — Medication SCH CAP: at 21:56

## 2020-12-19 RX ADMIN — ATORVASTATIN CALCIUM SCH MG: 20 TABLET, FILM COATED ORAL at 09:07

## 2020-12-19 RX ADMIN — AMIODARONE HYDROCHLORIDE SCH MG: 200 TABLET ORAL at 09:08

## 2020-12-19 RX ADMIN — REMDESIVIR SCH MLS/HR: 100 INJECTION, POWDER, LYOPHILIZED, FOR SOLUTION INTRAVENOUS at 09:00

## 2020-12-19 RX ADMIN — IPRATROPIUM BROMIDE AND ALBUTEROL SCH PUFF: 20; 100 SPRAY, METERED RESPIRATORY (INHALATION) at 20:00

## 2020-12-19 RX ADMIN — BENZONATATE SCH MG: 100 CAPSULE, LIQUID FILLED ORAL at 21:57

## 2020-12-19 RX ADMIN — PANTOPRAZOLE SODIUM SCH MG: 40 TABLET, DELAYED RELEASE ORAL at 09:07

## 2020-12-19 NOTE — PN
DATE:  12/19/2020



SUBJECTIVE:  The patient is resting, slightly propped up in bed, in no apparent

distress.  She denied any chest pain or shortness of breath.  She continued to

have cough with scanty whitish sputum.  She did sleep last night.  Her oxygen

requirement is down to 3 liters, maintaining her oxygen saturation at 94%.



PHYSICAL EXAMINATION:

GENERAL:  When I examined her, she looked pale, no jaundice, cyanosis or

thyromegaly.  No jugular venous distention.  No limb edema.

VITAL SIGNS:  Her heart rate was 75, blood pressure was 104/49, temperature

97.5, respiratory rate was 16, and oxygen saturation was 94% on 3 liters of

oxygen.

HEAD, EYES, EARS, NOSE AND THROAT:  Showed normocephalic, atraumatic.

NECK:  Supple.

CARDIAC:  Normal first and second heart sounds.  No gallop, rub or murmur.

CHEST:  Clear to auscultation.  No crepitation or rhonchi.

ABDOMEN:  Distended, soft, nontender.

NEUROLOGIC:  She was awake, alert, responding appropriately.  All cranial nerves

intact.  She moves extremities without difficulty.



Her intake over the last 24 hours was 1450.  No output was recorded.



LABORATORY DATA:  Her lab work this morning showed a white cell count of 11,700,

hemoglobin 13, hematocrit 40, MCV 90 and platelet count of 321,000.  Her

chemistry showed a serum sodium 141, potassium 4.1, chloride 109, bicarbonate

26, anion gap of 6, BUN 33, creatinine 1, estimated GFR was 54 mL per minute. 

Her glucose was 94, calcium was 7.9.  Her prothrombin time was __, INR of 5.2. 

Her coronavirus by PCR was detectable.  Her urine culture has shown no growth

and her blood cultures were so far negative after 3 days.



ASSESSMENT:

1.  COVID-19 pneumonia.

2.  Acute hypoxic respiratory failure.

3.  The patient has multiple other medical problems including:

A. Chronic atrial fibrillation with rapid ventricular response, rate much better

controlled now.

B.  Hypertension.

C.  Gastroesophageal reflux disease.

D.  Gout.



PLAN:  To continue with Remdesivir.  Continue with IV antibiotic.  Continue with

dexamethasone.  Continue with all other medications including the amiodarone and

metoprolol to control the heart rate.  I did start her on trazodone for

insomnia.





______________________________

LAURA COLUNGA MD



DR:  Lashonda  JOB#:  [id number] / [id number]

DD:  12/19/2020 14:12  DT:  12/19/2020 21:48

## 2020-12-20 VITALS — SYSTOLIC BLOOD PRESSURE: 102 MMHG | DIASTOLIC BLOOD PRESSURE: 57 MMHG

## 2020-12-20 VITALS — SYSTOLIC BLOOD PRESSURE: 116 MMHG | DIASTOLIC BLOOD PRESSURE: 59 MMHG

## 2020-12-20 VITALS — SYSTOLIC BLOOD PRESSURE: 100 MMHG | DIASTOLIC BLOOD PRESSURE: 48 MMHG

## 2020-12-20 VITALS — DIASTOLIC BLOOD PRESSURE: 58 MMHG | SYSTOLIC BLOOD PRESSURE: 92 MMHG

## 2020-12-20 RX ADMIN — LOPERAMIDE HCL PRN MG: 1 SUSPENSION ORAL at 01:09

## 2020-12-20 RX ADMIN — LOPERAMIDE HCL PRN MG: 1 SUSPENSION ORAL at 12:53

## 2020-12-20 RX ADMIN — Medication SCH CAP: at 20:30

## 2020-12-20 RX ADMIN — AMIODARONE HYDROCHLORIDE SCH MG: 200 TABLET ORAL at 09:04

## 2020-12-20 RX ADMIN — AZITHROMYCIN DIHYDRATE SCH MLS/HR: 500 INJECTION, POWDER, LYOPHILIZED, FOR SOLUTION INTRAVENOUS at 17:19

## 2020-12-20 RX ADMIN — CETIRIZINE HYDROCHLORIDE SCH MG: 10 TABLET, FILM COATED ORAL at 09:04

## 2020-12-20 RX ADMIN — LOPERAMIDE HCL PRN MG: 1 SUSPENSION ORAL at 09:09

## 2020-12-20 RX ADMIN — POTASSIUM CHLORIDE SCH MEQ: 1500 TABLET, EXTENDED RELEASE ORAL at 09:08

## 2020-12-20 RX ADMIN — ASPIRIN 81 MG SCH MG: 81 TABLET ORAL at 09:05

## 2020-12-20 RX ADMIN — BENZONATATE SCH MG: 100 CAPSULE, LIQUID FILLED ORAL at 20:30

## 2020-12-20 RX ADMIN — SUCRALFATE SCH GM: 1 TABLET ORAL at 12:52

## 2020-12-20 RX ADMIN — AMIODARONE HYDROCHLORIDE SCH MG: 200 TABLET ORAL at 20:12

## 2020-12-20 RX ADMIN — METOPROLOL TARTRATE SCH MG: 25 TABLET, FILM COATED ORAL at 20:11

## 2020-12-20 RX ADMIN — IPRATROPIUM BROMIDE AND ALBUTEROL SCH PUFF: 20; 100 SPRAY, METERED RESPIRATORY (INHALATION) at 20:30

## 2020-12-20 RX ADMIN — DEXAMETHASONE SODIUM PHOSPHATE SCH MG: 10 INJECTION INTRAMUSCULAR; INTRAVENOUS at 09:05

## 2020-12-20 RX ADMIN — SUCRALFATE SCH GM: 1 TABLET ORAL at 17:21

## 2020-12-20 RX ADMIN — SUCRALFATE SCH GM: 1 TABLET ORAL at 00:00

## 2020-12-20 RX ADMIN — ALBUTEROL SULFATE PRN PUFF: 90 AEROSOL, METERED RESPIRATORY (INHALATION) at 09:06

## 2020-12-20 RX ADMIN — IPRATROPIUM BROMIDE AND ALBUTEROL SCH PUFF: 20; 100 SPRAY, METERED RESPIRATORY (INHALATION) at 16:58

## 2020-12-20 RX ADMIN — PANTOPRAZOLE SODIUM SCH MG: 40 TABLET, DELAYED RELEASE ORAL at 09:05

## 2020-12-20 RX ADMIN — IPRATROPIUM BROMIDE AND ALBUTEROL SCH PUFF: 20; 100 SPRAY, METERED RESPIRATORY (INHALATION) at 12:52

## 2020-12-20 RX ADMIN — Medication SCH CAP: at 09:05

## 2020-12-20 RX ADMIN — Medication SCH EA: at 09:06

## 2020-12-20 RX ADMIN — BENZONATATE SCH MG: 100 CAPSULE, LIQUID FILLED ORAL at 12:52

## 2020-12-20 RX ADMIN — ATORVASTATIN CALCIUM SCH MG: 20 TABLET, FILM COATED ORAL at 09:05

## 2020-12-20 RX ADMIN — IPRATROPIUM BROMIDE AND ALBUTEROL SCH PUFF: 20; 100 SPRAY, METERED RESPIRATORY (INHALATION) at 09:06

## 2020-12-20 RX ADMIN — METOPROLOL TARTRATE SCH MG: 25 TABLET, FILM COATED ORAL at 09:06

## 2020-12-20 RX ADMIN — BENZONATATE SCH MG: 100 CAPSULE, LIQUID FILLED ORAL at 09:04

## 2020-12-20 RX ADMIN — REMDESIVIR SCH MLS/HR: 100 INJECTION, POWDER, LYOPHILIZED, FOR SOLUTION INTRAVENOUS at 09:11

## 2020-12-20 RX ADMIN — SUCRALFATE SCH GM: 1 TABLET ORAL at 09:04

## 2020-12-20 NOTE — PN
DATE:  12/20/2020



SUBJECTIVE:  The patient is resting, slightly propped up in bed, in no apparent

distress.  On questioning her, she denied any complaints.  Her cough is much

less and she is now on 2 liters of oxygen, maintaining her oxygen saturation at

94%.  Nursing staff did not voice any concerns that she had an uneventful night.



PHYSICAL EXAMINATION:

GENERAL:  When I examined her, she looked pale, but no jaundice, cyanosis or

thyromegaly.  No jugular venous distention or limb edema.

VITAL SIGNS:  Her heart rate was 67, blood pressure was 102/57, temperature was

97.3, respiratory rate 20, and oxygen saturation was 94% on 2 liters of oxygen.

HEAD, EYES, EARS, NOSE AND THROAT:  Showed normocephalic, atraumatic.

NECK:  Supple.

HEART:  Showed normal first and second heart sounds.  No gallop, rub or murmur.

CHEST:  Showed central trachea, equal bilateral expansion, air entry expands

with crepitation mostly in the left side posteriorly.  I could not appreciate

any rhonchi.

ABDOMEN:  Scaphoid, soft, nontender.

NEUROLOGIC:  She is hard of hearing, but otherwise all her cranial nerves are

intact.  She moves extremities without difficulty.  She ambulates without

assistance or assistive devices.



Her intake over the last 24 hours was 416, no output was reported.



LABORATORY DATA:  Her white cell count as of yesterday was 11,700; hemoglobin

13, hematocrit 40, MCV 90 and platelet count of 321,000.  Her chemistry showed a

serum sodium 141, potassium 4.1, chloride 109, bicarbonate 26, anion gap of 6,

BUN 33, creatinine 1, estimated GFR was 53 mL per minute.  Her glucose was 94

and calcium was 7.9.  Total bilirubin, AST, ALT, alkaline phosphatase were

normal.  Total protein 6.1, albumin 2.  Her coronavirus obviously is detectable.

 Urinalysis was mostly unremarkable and her prothrombin time as of yesterday was

50.6, INR of 5.2.



ASSESSMENT:

1.  COVID-19 pneumonia.

2.  Acute hypoxic respiratory failure, slowly improving.

3.  The patient has multiple other medical problems including:

A.  Chronic atrial fibrillation with rapid ventricular response, rate much

better controlled.

B.  Coumadin-induced coagulopathy.  Her INR as of yesterday was 5.4.

C.  Hypertension.

B.  Gastroesophageal reflux disease.

E.  Gout.



PLAN:  To continue with IV antibiotic.  Continue with remdesivir.  Continue with

dexamethasone as well as continue to hold her Coumadin.  She continued to be on

amiodarone, metoprolol and her heart rate is well controlled.  She was started

on trazodone for insomnia with good effect.





______________________________

LAURA COLUNGA MD



DR:  CEDRIC/deedee  JOB#:  [id number] / [id number]

DD:  12/20/2020 13:27  DT:  12/20/2020 20:27

## 2020-12-21 VITALS — DIASTOLIC BLOOD PRESSURE: 43 MMHG | SYSTOLIC BLOOD PRESSURE: 93 MMHG

## 2020-12-21 VITALS — DIASTOLIC BLOOD PRESSURE: 56 MMHG | SYSTOLIC BLOOD PRESSURE: 101 MMHG

## 2020-12-21 VITALS — SYSTOLIC BLOOD PRESSURE: 98 MMHG | DIASTOLIC BLOOD PRESSURE: 52 MMHG

## 2020-12-21 VITALS — SYSTOLIC BLOOD PRESSURE: 100 MMHG | DIASTOLIC BLOOD PRESSURE: 57 MMHG

## 2020-12-21 LAB
ALBUMIN SERPL-MCNC: 1.8 G/DL (ref 3.4–5)
ALBUMIN/GLOB SERPL: 0.5 {RATIO} (ref 1–1.7)
ALP SERPL-CCNC: 81 U/L (ref 46–116)
ALT SERPL-CCNC: 24 U/L (ref 14–59)
ANION GAP SERPL CALC-SCNC: 7 MMOL/L (ref 6–14)
AST SERPL-CCNC: 29 U/L (ref 15–37)
BILIRUB SERPL-MCNC: 0.3 MG/DL (ref 0.2–1)
BUN/CREAT SERPL: 31 (ref 6–20)
CA-I SERPL ISE-MCNC: 34 MG/DL (ref 7–20)
CALCIUM SERPL-MCNC: 8 MG/DL (ref 8.5–10.1)
CHLORIDE SERPL-SCNC: 110 MMOL/L (ref 98–107)
CO2 SERPL-SCNC: 25 MMOL/L (ref 21–32)
CREAT SERPL-MCNC: 1.1 MG/DL (ref 0.6–1)
ERYTHROCYTE [DISTWIDTH] IN BLOOD BY AUTOMATED COUNT: 14.4 % (ref 11.5–14.5)
GFR SERPLBLD BASED ON 1.73 SQ M-ARVRAT: 47.9 ML/MIN
GLOBULIN SER-MCNC: 3.4 G/DL (ref 2.2–3.8)
GLUCOSE SERPL-MCNC: 86 MG/DL (ref 70–99)
HCT VFR BLD CALC: 37.6 % (ref 36–47)
HGB BLD-MCNC: 12 G/DL (ref 12–15.5)
MCH RBC QN AUTO: 29 PG (ref 25–35)
MCHC RBC AUTO-ENTMCNC: 32 G/DL (ref 31–37)
MCV RBC AUTO: 90 FL (ref 79–100)
PLATELET # BLD AUTO: 313 X10^3/UL (ref 140–400)
POTASSIUM SERPL-SCNC: 3.6 MMOL/L (ref 3.5–5.1)
PROT SERPL-MCNC: 5.2 G/DL (ref 6.4–8.2)
RBC # BLD AUTO: 4.17 X10^6/UL (ref 3.5–5.4)
SODIUM SERPL-SCNC: 142 MMOL/L (ref 136–145)
WBC # BLD AUTO: 10.9 X10^3/UL (ref 4–11)

## 2020-12-21 RX ADMIN — PANTOPRAZOLE SODIUM SCH MG: 40 TABLET, DELAYED RELEASE ORAL at 09:20

## 2020-12-21 RX ADMIN — IPRATROPIUM BROMIDE AND ALBUTEROL SCH PUFF: 20; 100 SPRAY, METERED RESPIRATORY (INHALATION) at 20:18

## 2020-12-21 RX ADMIN — ALBUTEROL SULFATE PRN PUFF: 90 AEROSOL, METERED RESPIRATORY (INHALATION) at 09:18

## 2020-12-21 RX ADMIN — BENZONATATE SCH MG: 100 CAPSULE, LIQUID FILLED ORAL at 09:19

## 2020-12-21 RX ADMIN — ATORVASTATIN CALCIUM SCH MG: 20 TABLET, FILM COATED ORAL at 09:24

## 2020-12-21 RX ADMIN — LOPERAMIDE HCL PRN MG: 1 SUSPENSION ORAL at 20:34

## 2020-12-21 RX ADMIN — AZITHROMYCIN DIHYDRATE SCH MLS/HR: 500 INJECTION, POWDER, LYOPHILIZED, FOR SOLUTION INTRAVENOUS at 17:15

## 2020-12-21 RX ADMIN — REMDESIVIR SCH MLS/HR: 100 INJECTION, POWDER, LYOPHILIZED, FOR SOLUTION INTRAVENOUS at 09:24

## 2020-12-21 RX ADMIN — SUCRALFATE SCH GM: 1 TABLET ORAL at 12:44

## 2020-12-21 RX ADMIN — DEXAMETHASONE SODIUM PHOSPHATE SCH MG: 10 INJECTION INTRAMUSCULAR; INTRAVENOUS at 09:21

## 2020-12-21 RX ADMIN — Medication SCH CAP: at 09:19

## 2020-12-21 RX ADMIN — SUCRALFATE SCH GM: 1 TABLET ORAL at 05:49

## 2020-12-21 RX ADMIN — IPRATROPIUM BROMIDE AND ALBUTEROL SCH PUFF: 20; 100 SPRAY, METERED RESPIRATORY (INHALATION) at 09:25

## 2020-12-21 RX ADMIN — AMIODARONE HYDROCHLORIDE SCH MG: 200 TABLET ORAL at 09:19

## 2020-12-21 RX ADMIN — ASPIRIN 81 MG SCH MG: 81 TABLET ORAL at 09:24

## 2020-12-21 RX ADMIN — IPRATROPIUM BROMIDE AND ALBUTEROL SCH PUFF: 20; 100 SPRAY, METERED RESPIRATORY (INHALATION) at 12:44

## 2020-12-21 RX ADMIN — IPRATROPIUM BROMIDE AND ALBUTEROL SCH PUFF: 20; 100 SPRAY, METERED RESPIRATORY (INHALATION) at 17:13

## 2020-12-21 RX ADMIN — AMIODARONE HYDROCHLORIDE SCH MG: 200 TABLET ORAL at 20:34

## 2020-12-21 RX ADMIN — Medication SCH EA: at 09:21

## 2020-12-21 RX ADMIN — POTASSIUM CHLORIDE SCH MEQ: 1500 TABLET, EXTENDED RELEASE ORAL at 09:20

## 2020-12-21 RX ADMIN — SUCRALFATE SCH GM: 1 TABLET ORAL at 00:47

## 2020-12-21 RX ADMIN — Medication SCH CAP: at 20:34

## 2020-12-21 RX ADMIN — BENZONATATE SCH MG: 100 CAPSULE, LIQUID FILLED ORAL at 20:34

## 2020-12-21 RX ADMIN — BENZONATATE SCH MG: 100 CAPSULE, LIQUID FILLED ORAL at 14:07

## 2020-12-21 RX ADMIN — METOPROLOL TARTRATE SCH MG: 25 TABLET, FILM COATED ORAL at 09:20

## 2020-12-21 RX ADMIN — SUCRALFATE SCH GM: 1 TABLET ORAL at 17:13

## 2020-12-21 RX ADMIN — CETIRIZINE HYDROCHLORIDE SCH MG: 10 TABLET, FILM COATED ORAL at 09:20

## 2020-12-21 RX ADMIN — METOPROLOL TARTRATE SCH MG: 25 TABLET, FILM COATED ORAL at 19:54

## 2020-12-21 NOTE — PN
DATE:  12/21/2020



SUBJECTIVE:  The patient is sitting comfortably in her chair, in no apparent

distress.  She is maintaining her oxygen saturation at 95-96% on room air.  Her

cough is much less now.  Denied any shortness of breath.



PHYSICAL EXAMINATION:

GENERAL:  When I examined her, she was pale, somewhat cachectic, but no

jaundice, cyanosis or thyromegaly.  No jugular venous distention or limb edema.

VITAL SIGNS:  Her heart rate was 78, blood pressure was 100/60, temperature

97.8, respiratory rate 20, and oxygen saturation was 97% on room air.

HEAD:  Showed normocephalic, atraumatic.

NECK:  Supple.

HEART:  Normal first and second heart sounds.  No gallop, rub or murmur.

CHEST:  Clear to auscultation.  No crepitation or rhonchi.

ABDOMEN:  Scaphoid, soft, nontender.

NEUROLOGIC:  She was grossly intact.



Her intake was 1700, no output was recorded.



LABORATORY DATA:  Her lab work this morning showed a white cell count of 10,900,

hemoglobin 12, hematocrit 38, MCV 90 and platelet count 313,000.  Her chemistry

showed a serum sodium 142, potassium 3.6, chloride 110, bicarbonate 25, anion

gap of 7, BUN 34, creatinine 1.1, estimated GFR was 48 mL per minute.  Her

glucose was 86, calcium was 8.  Total bilirubin, AST, ALT, alkaline phosphatase

were normal.  Total protein 5.2, albumin was 1.8.  Her prothrombin time was

21.7, INR of 2.2.  Urinalysis was essentially unremarkable.



ASSESSMENT:

1.  COVID-19 pneumonia.

2.  Acute hypoxic respiratory failure, slowly improving.

3.  The patient has multiple other medical problems including:

A.  Chronic atrial fibrillation with rapid ventricular response, rate much

better controlled.

B.  Coumadin-induced coagulopathy.  Her INR is down to 2.2.

C.  Hypertension.

D.  Gastroesophageal reflux disease.

E.  Gout.



PLAN:  To continue with IV antibiotic.  Continue with all other medication.  I

will continue amiodarone and metoprolol to control the heart rate.  I will

resume her Coumadin and hopefully she will be discharged home tomorrow.





______________________________

LAURA COLUNGA MD



DR:  CEDRIC/deedee  JOB#:  [id number] / [id number]

DD:  12/21/2020 13:09  DT:  12/21/2020 18:49

## 2020-12-22 VITALS
DIASTOLIC BLOOD PRESSURE: 53 MMHG | SYSTOLIC BLOOD PRESSURE: 97 MMHG | SYSTOLIC BLOOD PRESSURE: 97 MMHG | DIASTOLIC BLOOD PRESSURE: 53 MMHG

## 2020-12-22 VITALS — DIASTOLIC BLOOD PRESSURE: 67 MMHG | SYSTOLIC BLOOD PRESSURE: 113 MMHG

## 2020-12-22 LAB
ANION GAP SERPL CALC-SCNC: 8 MMOL/L (ref 6–14)
CA-I SERPL ISE-MCNC: 35 MG/DL (ref 7–20)
CALCIUM SERPL-MCNC: 8.1 MG/DL (ref 8.5–10.1)
CHLORIDE SERPL-SCNC: 110 MMOL/L (ref 98–107)
CO2 SERPL-SCNC: 24 MMOL/L (ref 21–32)
CREAT SERPL-MCNC: 0.9 MG/DL (ref 0.6–1)
GFR SERPLBLD BASED ON 1.73 SQ M-ARVRAT: 60.4 ML/MIN
GLUCOSE SERPL-MCNC: 85 MG/DL (ref 70–99)
POTASSIUM SERPL-SCNC: 3.9 MMOL/L (ref 3.5–5.1)
SODIUM SERPL-SCNC: 142 MMOL/L (ref 136–145)

## 2020-12-22 RX ADMIN — ATORVASTATIN CALCIUM SCH MG: 20 TABLET, FILM COATED ORAL at 09:08

## 2020-12-22 RX ADMIN — SUCRALFATE SCH GM: 1 TABLET ORAL at 12:29

## 2020-12-22 RX ADMIN — Medication SCH CAP: at 09:06

## 2020-12-22 RX ADMIN — ASPIRIN 81 MG SCH MG: 81 TABLET ORAL at 09:06

## 2020-12-22 RX ADMIN — IPRATROPIUM BROMIDE AND ALBUTEROL SCH PUFF: 20; 100 SPRAY, METERED RESPIRATORY (INHALATION) at 12:29

## 2020-12-22 RX ADMIN — DEXAMETHASONE SODIUM PHOSPHATE SCH MG: 10 INJECTION INTRAMUSCULAR; INTRAVENOUS at 09:09

## 2020-12-22 RX ADMIN — BENZONATATE SCH MG: 100 CAPSULE, LIQUID FILLED ORAL at 09:06

## 2020-12-22 RX ADMIN — METOPROLOL TARTRATE SCH MG: 25 TABLET, FILM COATED ORAL at 09:08

## 2020-12-22 RX ADMIN — Medication SCH EA: at 09:11

## 2020-12-22 RX ADMIN — ALBUTEROL SULFATE PRN PUFF: 90 AEROSOL, METERED RESPIRATORY (INHALATION) at 09:10

## 2020-12-22 RX ADMIN — BENZONATATE SCH MG: 100 CAPSULE, LIQUID FILLED ORAL at 14:24

## 2020-12-22 RX ADMIN — POTASSIUM CHLORIDE SCH MEQ: 1500 TABLET, EXTENDED RELEASE ORAL at 09:07

## 2020-12-22 RX ADMIN — SUCRALFATE SCH GM: 1 TABLET ORAL at 00:15

## 2020-12-22 RX ADMIN — IPRATROPIUM BROMIDE AND ALBUTEROL SCH PUFF: 20; 100 SPRAY, METERED RESPIRATORY (INHALATION) at 09:10

## 2020-12-22 RX ADMIN — PANTOPRAZOLE SODIUM SCH MG: 40 TABLET, DELAYED RELEASE ORAL at 09:07

## 2020-12-22 RX ADMIN — AMIODARONE HYDROCHLORIDE SCH MG: 200 TABLET ORAL at 09:07

## 2020-12-22 RX ADMIN — CETIRIZINE HYDROCHLORIDE SCH MG: 10 TABLET, FILM COATED ORAL at 09:06

## 2020-12-22 RX ADMIN — SUCRALFATE SCH GM: 1 TABLET ORAL at 05:16

## 2020-12-22 NOTE — DS
DATE OF DISCHARGE:  12/22/2020



HOSPITAL COURSE:  The patient is a 80-year-old  female patient who was

admitted on 12/15/2020 with increasing shortness of breath, cough.  She has also

Coumadin-induced coagulopathy.  Her chest x-ray showed that she has bilateral

bibasilar infiltrate and chronic left pleural effusion.  She was admitted

originally with community-acquired pneumonia and coagulopathy.  Subsequently,

her COVID-19 came back positive.  Her influenza A and B were negative.  She was

treated with IV antibiotic in the form of ceftriaxone as well as Zithromax,

dexamethasone and remdesivir.  Initially, her oxygen requirement was high and

has gradually improved such that she is on room air, maintaining her oxygen

saturation at 95%.



PHYSICAL EXAMINATION:

GENERAL:  When I examined her today, she looked well and was clearly in no

apparent respiratory distress.  No pallor, jaundice, cyanosis or thyromegaly. 

No jugular venous distention.  No lower limb edema.

VITAL SIGNS:  Her heart rate was 93, blood pressure was 97/53, temperature was

97.3, respiratory rate was 18 and oxygen saturation was 95% on room air.

HEAD, EYES, EARS, NOSE AND THROAT:  Normocephalic, atraumatic.

NECK:  Supple.

HEART:  Normal first and second heart sounds.  No gallop or murmur.

CHEST:  Clear to auscultation.  No crepitation or rhonchi.

ABDOMEN:  Scaphoid, soft, nontender.

NEUROLOGIC:  She was awake, alert, responding appropriately.  All cranial nerves

intact.

EXTREMITIES:  She moves extremities without difficulty.  She ambulates without

assistance or assistive devices.



LABORATORY DATA:  As of yesterday, her white cell count was 10,900, hemoglobin

12, hematocrit 38, MCV 90 and platelet count of 313,000.  Her chemistry showed a

serum sodium 142, potassium 3.9, chloride 110, bicarbonate 24, anion gap of 8,

BUN 35, creatinine 0.9.  Estimated GFR was 60 mL per minute.  Her glucose was

85, calcium was 8.1.  As of this morning, her prothrombin time was 10.5, INR of

2.  Her urinalysis essentially unremarkable and her coronavirus PCR was

detectable.  Her influenza A and B were negative.



DISCHARGE MEDICATIONS:  She will be discharged home to continue on cefdinir 300

mg twice a day for 4 more days, dexamethasone 4 mg once a day for 3 days and

dexamethasone 2 mg once a day for 2 or 3 days, amiodarone 200 mg twice a day,

amlodipine 5 mg twice a day, aspirin 81 mg once a day, atorvastatin calcium 20

mg at bedtime, glucosamine chondroitin sulfate, Osteo Bi-Flex 1 tablet daily. 

She is on hydrochlorothiazide 12.5 mg once a day, ipratropium bromide for

Atrovent.  She is also on loperamide 2 mg daily as needed, loratadine 10 mg once

a day, lorazepam 0.5 mg every 8 hours as needed, losartan potassium for Cozaar

100 mg daily, omeprazole 20 mg twice a day, potassium chloride 20 mEq daily,

sucralfate 1 gram 4 times a day and warfarin 4 mg once a day.



FINAL DISCHARGE DIAGNOSES:

1.  COVID-19 pneumonia.

2.  Acute hypoxic respiratory failure, improved.  She is now on room air,

maintaining her oxygen saturation at 95%.

3.  The patient has multiple other medical problems including:

a.  Chronic atrial fibrillation with rapid ventricular response, rate much

better controlled.

b.  Coumadin-induced coagulopathy.  Her INR is 2.1.

c.  Hypertension.

d.  Gastroesophageal reflux disease.

e.  Gout.





______________________________

LAURA COLUNGA MD



DR:  CEDRIC/deedee  JOB#:  [id number] / [id number]

DD:  12/22/2020 14:02  DT:  12/22/2020 14:20

## 2021-04-15 ENCOUNTER — HOSPITAL ENCOUNTER (OUTPATIENT)
Dept: HOSPITAL 63 - MAMMO | Age: 81
End: 2021-04-15
Attending: INTERNAL MEDICINE
Payer: MEDICARE

## 2021-04-15 DIAGNOSIS — Z12.31: Primary | ICD-10-CM

## 2021-04-15 PROCEDURE — 77067 SCR MAMMO BI INCL CAD: CPT

## 2021-04-15 PROCEDURE — 77063 BREAST TOMOSYNTHESIS BI: CPT

## 2021-06-02 ENCOUNTER — HOSPITAL ENCOUNTER (OUTPATIENT)
Dept: HOSPITAL 61 - PF | Age: 81
End: 2021-06-02
Attending: INTERNAL MEDICINE
Payer: MEDICARE

## 2021-06-02 DIAGNOSIS — R00.2: Primary | ICD-10-CM

## 2021-06-02 PROCEDURE — 94010 BREATHING CAPACITY TEST: CPT

## 2021-06-02 PROCEDURE — 94726 PLETHYSMOGRAPHY LUNG VOLUMES: CPT

## 2021-06-02 PROCEDURE — 94729 DIFFUSING CAPACITY: CPT

## 2021-06-08 NOTE — RESP
DATE OF SERVICE: 06/02/2021

ATTENDING PHYSICIAN:  Steve Calix MD



The patient underwent full pulmonary function testing on 06/02.  The FEV1 to FVC

ratio was 62%, FEV1 was slightly decreased to 88% of predicted at 2.14 liters.  

FVC was 106% of predicted, at 3.45 liters.  Total lung capacity was increased at

160% of predicted at total lung capacity of 9 liters.  Residual volume was 

likewise increased.



IMPRESSION:

1.  Mild airflow limitation.

2.  Residual volume increased secondary to air trapping.

3.  Preserved diffusion capacity.







GISELA

DR: Mendoza   DD: 06/08/2021 13:26

DT: 06/08/2021 13:50   TID: [id number]

## 2021-10-19 ENCOUNTER — HOSPITAL ENCOUNTER (EMERGENCY)
Dept: HOSPITAL 63 - ER | Age: 81
Discharge: HOME | End: 2021-10-19
Payer: MEDICARE

## 2021-10-19 VITALS
DIASTOLIC BLOOD PRESSURE: 80 MMHG | SYSTOLIC BLOOD PRESSURE: 168 MMHG | DIASTOLIC BLOOD PRESSURE: 80 MMHG | DIASTOLIC BLOOD PRESSURE: 80 MMHG | SYSTOLIC BLOOD PRESSURE: 168 MMHG | SYSTOLIC BLOOD PRESSURE: 168 MMHG

## 2021-10-19 VITALS — WEIGHT: 144.4 LBS | BODY MASS INDEX: 20.67 KG/M2 | HEIGHT: 70 IN

## 2021-10-19 DIAGNOSIS — K62.5: Primary | ICD-10-CM

## 2021-10-19 LAB
ALBUMIN SERPL-MCNC: 3.4 G/DL (ref 3.4–5)
ALBUMIN/GLOB SERPL: 0.9 {RATIO} (ref 1–1.7)
ALP SERPL-CCNC: 152 U/L (ref 46–116)
ALT SERPL-CCNC: 21 U/L (ref 14–59)
ANION GAP SERPL CALC-SCNC: 6 MMOL/L (ref 6–14)
APTT PPP: YELLOW S
AST SERPL-CCNC: 19 U/L (ref 15–37)
BACTERIA #/AREA URNS HPF: (no result) /HPF
BASOPHILS # BLD AUTO: 0 X10^3/UL (ref 0–0.2)
BASOPHILS NFR BLD: 1 % (ref 0–3)
BILIRUB SERPL-MCNC: 0.3 MG/DL (ref 0.2–1)
BILIRUB UR QL STRIP: (no result)
BUN/CREAT SERPL: 28 (ref 6–20)
CA-I SERPL ISE-MCNC: 28 MG/DL (ref 7–20)
CALCIUM SERPL-MCNC: 8.9 MG/DL (ref 8.5–10.1)
CHLORIDE SERPL-SCNC: 105 MMOL/L (ref 98–107)
CO2 SERPL-SCNC: 31 MMOL/L (ref 21–32)
CREAT SERPL-MCNC: 1 MG/DL (ref 0.6–1)
EOSINOPHIL NFR BLD: 0.1 X10^3/UL (ref 0–0.7)
EOSINOPHIL NFR BLD: 1 % (ref 0–3)
ERYTHROCYTE [DISTWIDTH] IN BLOOD BY AUTOMATED COUNT: 15.7 % (ref 11.5–14.5)
FIBRINOGEN PPP-MCNC: CLEAR MG/DL
GFR SERPLBLD BASED ON 1.73 SQ M-ARVRAT: 53.3 ML/MIN
GLOBULIN SER-MCNC: 3.7 G/DL (ref 2.2–3.8)
GLUCOSE SERPL-MCNC: 113 MG/DL (ref 70–99)
GLUCOSE UR STRIP-MCNC: (no result) MG/DL
HCT VFR BLD CALC: 33.8 % (ref 36–47)
HGB BLD-MCNC: 10.9 G/DL (ref 12–15.5)
LIPASE: 53 U/L (ref 73–393)
LYMPHOCYTES # BLD: 1.7 X10^3/UL (ref 1–4.8)
LYMPHOCYTES NFR BLD AUTO: 21 % (ref 24–48)
MCH RBC QN AUTO: 28 PG (ref 25–35)
MCHC RBC AUTO-ENTMCNC: 32 G/DL (ref 31–37)
MCV RBC AUTO: 87 FL (ref 79–100)
MONO #: 0.8 X10^3/UL (ref 0–1.1)
MONOCYTES NFR BLD: 10 % (ref 0–9)
NEUT #: 5.8 X10^3UL (ref 1.8–7.7)
NEUTROPHILS NFR BLD AUTO: 69 % (ref 31–73)
NITRITE UR QL STRIP: (no result)
PLATELET # BLD AUTO: 306 X10^3/UL (ref 140–400)
POTASSIUM SERPL-SCNC: 3.5 MMOL/L (ref 3.5–5.1)
PROT SERPL-MCNC: 7.1 G/DL (ref 6.4–8.2)
RBC # BLD AUTO: 3.88 X10^6/UL (ref 3.5–5.4)
RBC #/AREA URNS HPF: 0 /HPF (ref 0–2)
SODIUM SERPL-SCNC: 142 MMOL/L (ref 136–145)
SP GR UR STRIP: 1.02
SQUAMOUS #/AREA URNS LPF: (no result) /LPF
UROBILINOGEN UR-MCNC: 0.2 MG/DL
WBC # BLD AUTO: 8.5 X10^3/UL (ref 4–11)
WBC #/AREA URNS HPF: (no result) /HPF (ref 0–4)

## 2021-10-19 PROCEDURE — C9113 INJ PANTOPRAZOLE SODIUM, VIA: HCPCS

## 2021-10-19 PROCEDURE — 86850 RBC ANTIBODY SCREEN: CPT

## 2021-10-19 PROCEDURE — 93005 ELECTROCARDIOGRAM TRACING: CPT

## 2021-10-19 PROCEDURE — 96374 THER/PROPH/DIAG INJ IV PUSH: CPT

## 2021-10-19 PROCEDURE — 83690 ASSAY OF LIPASE: CPT

## 2021-10-19 PROCEDURE — 81001 URINALYSIS AUTO W/SCOPE: CPT

## 2021-10-19 PROCEDURE — 85730 THROMBOPLASTIN TIME PARTIAL: CPT

## 2021-10-19 PROCEDURE — 86901 BLOOD TYPING SEROLOGIC RH(D): CPT

## 2021-10-19 PROCEDURE — 85610 PROTHROMBIN TIME: CPT

## 2021-10-19 PROCEDURE — 85025 COMPLETE CBC W/AUTO DIFF WBC: CPT

## 2021-10-19 PROCEDURE — 74177 CT ABD & PELVIS W/CONTRAST: CPT

## 2021-10-19 PROCEDURE — 96361 HYDRATE IV INFUSION ADD-ON: CPT

## 2021-10-19 PROCEDURE — 86900 BLOOD TYPING SEROLOGIC ABO: CPT

## 2021-10-19 PROCEDURE — 80053 COMPREHEN METABOLIC PANEL: CPT

## 2021-10-19 PROCEDURE — 99285 EMERGENCY DEPT VISIT HI MDM: CPT

## 2021-10-19 PROCEDURE — 87086 URINE CULTURE/COLONY COUNT: CPT

## 2021-10-19 PROCEDURE — 36415 COLL VENOUS BLD VENIPUNCTURE: CPT

## 2021-10-19 NOTE — RAD
CT OF THE ABDOMEN AND PELVIS WITH IV CONTRAST.



History:  Reason: GI BLEED HX OF BLEEDS IN THE PAST / Spl. Instructions:  / History: 



Comparison:None.



Procedure: 

Contiguous axial images of the abdomen and pelvis were performed  after the administration of 75 cc o
f Isovue 370 IV contrast.



Oral contrast:  No.



Findings:



There is been prior colon study. The common bile is mildly dilated to 8.5 mm.



There is a 4 cm length of asymmetric moderate wall thickening of the proximal sigmoid colon.



The appendix is not seen.



There is fat-containing inguinal canal hernias. There is moderate sigmoid diverticulosis without surr
ounding inflammation.



Liver: Unremarkable

Spleen: Unremarkable

Pancreas: Atrophic

Adrenal Glands: 1.6 cm hypoattenuating lesion in the left adrenal is likely an adrenal adenoma

Kidneys: Hypoattenuating lesion with volume loss and peripheral calcification in the mid right kidney
 posteriorly suggest postsurgical scar.



There is no mass or lymphadenopathy.



There is no free air.  



There is no free fluid.



The urinary bladder appears normal.



There is a small hiatal hernia.



Impression:



1. 4 cm length of moderate asymmetric wall thickening medially in the proximal sigmoid colon is suspi
cious for an adenocarcinoma. Inflammatory or infectious colitis is possible. There is no diverticulit
is. There is no air in the wall to suggest ischemic colitis.



2. Mildly dilated common bile duct not unusual in a postcholecystectomy patient.



End Impression



PQRS Compliance Statement:



One or more of the following individualized dose reduction techniques were utilized for this examinat
ion:  

1. Automated exposure control  

2. Adjustment of the mA and/or kV according to patient size  

3. Use of iterative reconstruction technique



Electronically signed by: Jesus Liu III, MD (10/19/2021 5:58 PM) [facility]

## 2021-10-19 NOTE — EKG
[facility] [address], [place] [address]

Test Date:    2021-10-19               Test Time:    16:23:55

Pat Name:     KEYON FORBES           Department:   

Patient ID:   [id number]           Room:          

Gender:       F                        Technician:   CHARLEY

:          1940               Requested By: CARLO BARRETO

Order Number: [id number]            Reading MD:   Henrique Steele MD

                                 Measurements

Intervals                              Axis          

Rate:         89                       P:            90

UT:           170                      QRS:          6

QRSD:         90                       T:            30

QT:           394                                    

QTc:          480                                    

                           Interpretive Statements

SINUS RHYTHM

PROLONGED QT

Electronically Signed On 10- 17:32:13 CDT by Henrique Steele MD

## 2021-10-19 NOTE — PHYS DOC
Past History


Past Medical History:  A-Fib


 (CARLO BARRETO)


Past Surgical History:  Other


Additional Past Surgical Histo:  IV filter


 (CARLO BARRETO)


Alcohol Use:  None


 (CARLO BARRETO)





General Adult


EDM:


Chief Complaint:  RECTAL BLEED





HPI:


HPI:


Patient is a 81-year-old female who presents to the emergency department for 

rectal bleeding.  She states her rectal bleeding started 48 hours ago.  She 

states that the rectal bleeding is with a bowel movement.  It is dark red and 

mixed in with the stool.  It does not fill the toilet she states.  Patient's 

last bowel movement was today.  She has a history of GI bleeding and follows up 

with a GI doctor at [facility].  She was sent into the ER by Dr. Fleming.  Patient denies any chest pain, shortness of breath, lightheadedness, 

nausea, vomiting, diarrhea, abdominal pain, fevers.  Patient has a history of A.

fib and takes 81 mg of aspirin daily.


 (CARLO BARRETO)





Review of Systems:


Review of Systems:


14 body systems of the review of systems have been reviewed.  See HPI for 

pertinent positive and negative responses, otherwise all other systems are 

negative, nonpertinent or noncontributory


 (CARLO BARRETO)





Current Medications:


Current Meds:





Current Medications








 Medications


  (Trade)  Dose


 Ordered  Sig/Josh  Start Time


 Stop Time Status Last Admin


Dose Admin


 


 Sodium Chloride  1,000 ml @ 


 1,000 mls/hr  Q1H  10/19/21 16:15


 10/19/21 17:14 UNV  











 (CARLO BARRETO)





Allergies:


Allergies:





Allergies








Coded Allergies Type Severity Reaction Last Updated Verified


 


  No Known Drug Allergies    12/15/20 No








 (CARLO BARRETO)





Physical Exam:


PE:





Constitutional: Well developed, well nourished, no acute distress, non-toxic 

appearance. []


HENT: Normocephalic, atraumatic, bilateral external ears normal, oropharynx 

moist, no oral exudates, nose normal. []


Eyes: PERRL, EOMI, conjunctiva normal, no discharge. [] 


Neck: Normal range of motion, no stridor


Cardiovascular:Heart rate regular rhythm, no murmur []


Lungs & Thorax:  Bilateral breath sounds clear to auscultation []


Abdomen: Bowel sounds normal, soft, no tenderness, no masses, no pulsatile 

masses. [] 


Skin: Warm, dry, no erythema, no rash. [] 


Back: Normal range of motion


Extremities: No tenderness, no cyanosis, no clubbing, ROM intact, no edema. [] 


Neurologic: Alert and oriented X 3, normal motor function, normal sensory fun

ction, no focal deficits noted. []


Psychologic: Affect normal, judgement normal, mood normal. []


 (CARLO BARRETO)





Current Patient Data:


Labs:





Laboratory Tests








Test


 10/19/21


16:36


 


White Blood Count 8.5 x10^3/uL 


 


Red Blood Count 3.88 x10^6/uL 


 


Hemoglobin 10.9 g/dL 


 


Hematocrit 33.8 % 


 


Mean Corpuscular Volume 87 fL 


 


Mean Corpuscular Hemoglobin 28 pg 


 


Mean Corpuscular Hemoglobin


Concent 32 g/dL 





 


Red Cell Distribution Width 15.7 % 


 


Platelet Count 306 x10^3/uL 


 


Neutrophils (%) (Auto) 69 % 


 


Lymphocytes (%) (Auto) 21 % 


 


Monocytes (%) (Auto) 10 % 


 


Eosinophils (%) (Auto) 1 % 


 


Basophils (%) (Auto) 1 % 


 


Neutrophils # (Auto) 5.8 x10^3uL 


 


Lymphocytes # (Auto) 1.7 x10^3/uL 


 


Monocytes # (Auto) 0.8 x10^3/uL 


 


Eosinophils # (Auto) 0.1 x10^3/uL 


 


Basophils # (Auto) 0.0 x10^3/uL 


 


Prothrombin Time 10.2 SEC 


 


Prothromb Time International


Ratio 1.0 





 


Activated Partial


Thromboplast Time 23 SEC 





 


Sodium Level 142 mmol/L 


 


Potassium Level 3.5 mmol/L 


 


Chloride Level 105 mmol/L 


 


Carbon Dioxide Level 31 mmol/L 


 


Anion Gap 6 


 


Blood Urea Nitrogen 28 mg/dL 


 


Creatinine 1.0 mg/dL 


 


Estimated GFR


(Cockcroft-Gault) 53.3 





 


BUN/Creatinine Ratio 28 


 


Glucose Level 113 mg/dL 


 


Calcium Level 8.9 mg/dL 


 


Total Bilirubin 0.3 mg/dL 


 


Aspartate Amino Transf


(AST/SGOT) 19 U/L 





 


Alanine Aminotransferase


(ALT/SGPT) 21 U/L 





 


Alkaline Phosphatase 152 U/L 


 


Total Protein 7.1 g/dL 


 


Albumin 3.4 g/dL 


 


Albumin/Globulin Ratio 0.9 


 


Lipase 53 U/L 








Current Medications








 Medications


  (Trade)  Dose


 Ordered  Sig/Josh


 Route


 PRN Reason  Start Time


 Stop Time Status Last Admin


Dose Admin


 


 Sodium Chloride  1,000 ml @ 


 1,000 mls/hr  Q1H


 IV


   10/19/21 16:15


 10/19/21 17:14 DC 10/19/21 17:43





 


 Pantoprazole


 Sodium


  (Protonix Vial)  80 mg  1X  ONCE


 IVP


   10/19/21 16:45


 10/19/21 16:46 DC 10/19/21 17:43





 


 Iohexol


  (Omnipaque 300


 Mg/ml)  75 ml  1X  ONCE


 IV


   10/19/21 17:30


 10/19/21 17:32 DC 10/19/21 17:31











Vital Signs:





                                   Vital Signs








  Date Time  Temp Pulse Resp B/P (MAP) Pulse Ox O2 Delivery O2 Flow Rate FiO2


 


10/19/21 16:22 98.4 90 16 148/89 (108) 99 Room Air  








 (CARLO BARRETO APRN)





EKG:


EKG:


EKG performed by ER staff at 1623 shows sinus rhythm with a heart rate of 

eighty-nine, QTc four eighty, no STEMI read by Dr. Hayes at 1629


 (CARLO BARRETO APRN)





Radiology/Procedures:


Radiology/Procedures:


[]PROCEDURE: CT ABD PELV W/ IV CONTRST ONLY





CT OF THE ABDOMEN AND PELVIS WITH IV CONTRAST.





History:  Reason: GI BLEED HX OF BLEEDS IN THE PAST / Spl. Instructions:  / 

History: 





Comparison:None.





Procedure: 


Contiguous axial images of the abdomen and pelvis were performed  after the 

administration of 75 cc of Isovue 370 IV contrast.





Oral contrast:  No.





Findings:





There is been prior colon study. The common bile is mildly dilated to 8.5 mm.





There is a 4 cm length of asymmetric moderate wall thickening of the proximal 

sigmoid colon.





The appendix is not seen.





There is fat-containing inguinal canal hernias. There is moderate sigmoid 

diverticulosis without surrounding inflammation.





Liver: Unremarkable


Spleen: Unremarkable


Pancreas: Atrophic


Adrenal Glands: 1.6 cm hypoattenuating lesion in the left adrenal is likely an 

adrenal adenoma


Kidneys: Hypoattenuating lesion with volume loss and peripheral calcification in

 the mid right kidney posteriorly suggest postsurgical scar.





There is no mass or lymphadenopathy.





There is no free air.  





There is no free fluid.





The urinary bladder appears normal.





There is a small hiatal hernia.





Impression:





1. 4 cm length of moderate asymmetric wall thickening medially in the proximal 

sigmoid colon is suspicious for an adenocarcinoma. Inflammatory or infectious 

colitis is possible. There is no diverticulitis. There is no air in the wall to 

suggest ischemic colitis.





2. Mildly dilated common bile duct not unusual in a postcholecystectomy patient.





End Impression





PQRS Compliance Statement:





One or more of the following individualized dose reduction techniques were 

utilized for this examination:  


1. Automated exposure control  


2. Adjustment of the mA and/or kV according to patient size  


3. Use of iterative reconstruction technique





Electronically signed by: Colleen Alicea III, MD (10/19/2021 5:58 PM) [facility]














DICTATED AND SIGNED BY:     COLLEEN ALICEA III, MD


DATE:     10/19/21 1750





CC: CARLO BARRETO; COOKIE FLEMING MD ~MTH0 0


 (CARLO BARRETO)





Heart Score:


C/O Chest Pain:  N/A


Risk Factors:


Risk Factors:  DM, Current or recent (<one month) smoker, HTN, HLP, family 

history of CAD, obesity.


Risk Scores:


Score 0 - 3:  2.5% MACE over next 6 weeks - Discharge Home


Score 4 - 6:  20.3% MACE over next 6 weeks - Admit for Clinical Observation


Score 7 - 10:  72.7% MACE over next 6 weeks - Early Invasive Strategies


 (CARLO BARRETO)





Course & Med Decision Making:


Course & Med Decision Making


Pertinent Labs and Imaging studies reviewed. (See chart for details)





[] Patient presents to the emergency department for rectal bleeding.  Work-up in

 the ER consisted of blood work, CT scan of abdomen, EKG.  Patient treated with 

a liter of normal saline and IV protonix.  Patient's vital signs are stable at 

this time, she is not tachycardic nor hypotensive. Fecal occult positive for 

bright red blood. hgb 10.9, hct 33.8, normal cmp, CT scan of abdomen shows 4 cm 

wall thickening proximal sigmoid colon suspicious for adenocarcinoma or 

infectious/inflammatory colitis.  I paged the patient's GI doctor at [facility].  For return call, patient notifies ER nurse that she would like 

to be discharged home with the antibiotic and given records of her CT imaging so

 that she can follow-up with the GI doctor.  She states that she does not like 

her old GI doctor and would like the imaging reports that she can follow-up with

 a new one.  Patient's vital signs are stable at this time.  Patient given an 

antibiotic to treat colitis.  Patient given first dose in the ER.  I discussed 

with patient all findings and diagnostic testing as well as the need to follow-

up with PCP for further evaluation and treatment or return to the ER if any new 

or worsening symptoms.  Strict return precautions were also discussed at length.

  Patient voiced understanding and agreement with the plan.  Patient is 

hemodynamically stable at the time of disposition.


 (CARLO BARRETO)





Dragon Disclaimer:


Dragon Disclaimer:


This electronic medical record was generated, in whole or in part, using a voice

 recognition dictation system.


 (CARLO BARRETO)


Attending Co-Sign


The patient was seen and interviewed as well as examined at the bedside. The 

chart was reviewed. The case was discussed. Agree with the plan of care.


 (EFRAIN COLLINS DO)





Departure


Departure:


Impression:  


   Primary Impression:  


   Rectal bleeding


Disposition:  01 HOME / SELF CARE / HOMELESS


Condition:  GOOD


Referrals:  


COOKIE FLEMING MD (PCP)


Patient Instructions:  Colitis, Rectal Bleeding





Additional Instructions:  


You were seen in the emergency department for rectal bleeding.  Your hemoglobin 

level was 10.9 today.  This does not indicate that you need a blood transfusion 

at this time.  Your CT scan showed inflammatory changes of your colon which 

could indicate colitis or adenocarcinoma.  You were discharged home with the CT 

report.  You requested to be discharged home.  Please follow-up with your GI 

doctor tomorrow regarding your ER visit.  Please return to the emergency 

department if you develop worsening of your rectal bleeding, lightheadedness, 

chest pain, shortness of breath, palpitations, intractable nausea or vomiting, 

blood in your vomit, high fevers refractory to treatment or abdominal pain.





EMERGENCY DEPARTMENT GENERAL DISCHARGE INSTRUCTIONS





Thank you for coming to [facility] (ED) today and trusting us

 with you 


care.  We trust that you had a positivie experience in our Emergency Department.

  If you 


wish to speak to the department management, you may call the director at 

[phone].





YOUR FOLLOW UP INSTRUCTIONS ARE AS FOLLOWS:





1.  Do you have a private Doctor?  If you do not have a private doctor, please 

ask for a 


resource list of physicians or clinics that may be able to assist you with 

follow up care.





2.  The Emergency Physician has interpreted your x-rays.  The X-Ray specialist 

will also 


review them.  If there is a change in the findings, you will be notified in 48 

hours when at 


all possible.





3.  A lab test or culture has been done, your results will be reviewed and you 

will be 


notified if you need a change in treatment.





ADDITIONAL INSTRUCTIONS AND INFORMATION:





1.  Your care today has been supervised by a physician who is specially trained 

in emergency 


care.  Many problems require more than one evaluation for a complete diagnosis 

and 


treatment.  We recommend that you schedule your follow up appointment as 

recommended to 


ensure complete treatment of you illness or injury.  If you are unable to obtain

 follow up 


care and continue to have a problem, or if your condition worsens, we recommend 

that you 


return to the ED.





2.  We are not able to safely determine your condition over the phone nor are we

 able to 


give sound medical advice over the phone.  For these safety reasons, if you call

 for medical 


advice we will ask you to come to the ED for further evaluation.





3.  If you have any questions regarding these discharge instructions please call

 the ED at 


[phone].





SAFETY INFORMATION:





In the interest of safety, wellness, and injury prevention; we encourage you to 

wear your 


sealbelt, if you smoke; quite smoking, and we encourage family to use a 

protective helmet 


for bicycling and other sporting events that present an increased risk for head 

injury.





IF YOUR SYMPTOMS WORSEN OR NEW SYMPTOMS DEVELOP, OR YOU HAVE CONCERNS ABOUT YOUR

 CONDITION; 


OR IF YOUR CONDITION WORSENS WHILE YOU ARE WAITING FOR YOUR FOLLOW UP 

APPOINTMENT; EITHER 


CONTACT YOUR PRIMARY CARE DOCTOR, THE PHYSICIAN WHOSE NAME AND NUMBER YOU WERE 

GIVEN, OR 


RETURN TO THE ED IMMEDIATELY.


Scripts


Amoxicillin/Potassium Clav (AUGMENTIN 875-125 TABLET) 1 Each Tablet


1 TAB PO BID for colitis for 10 Days, #20 TAB 0 Refills


   Prov: CARLO BARRETO         10/19/21











CARLO BARRETO          Oct 19, 2021 16:39


EFRAIN COLLINS DO                 Oct 20, 2021 05:57

## 2022-04-07 ENCOUNTER — HOSPITAL ENCOUNTER (OUTPATIENT)
Dept: HOSPITAL 63 - RAD | Age: 82
End: 2022-04-07
Attending: INTERNAL MEDICINE
Payer: MEDICARE

## 2022-04-07 DIAGNOSIS — M47.814: ICD-10-CM

## 2022-04-07 DIAGNOSIS — I70.0: ICD-10-CM

## 2022-04-07 DIAGNOSIS — Z79.899: ICD-10-CM

## 2022-04-07 DIAGNOSIS — I48.19: Primary | ICD-10-CM

## 2022-04-07 DIAGNOSIS — I51.7: ICD-10-CM

## 2022-04-07 LAB
ALBUMIN SERPL-MCNC: 3.2 G/DL (ref 3.4–5)
ALBUMIN/GLOB SERPL: 1 {RATIO} (ref 1–1.7)
ALP SERPL-CCNC: 137 U/L (ref 46–116)
ALT SERPL-CCNC: 21 U/L (ref 14–59)
ANION GAP SERPL CALC-SCNC: 7 MMOL/L (ref 6–14)
AST SERPL-CCNC: 18 U/L (ref 15–37)
BILIRUB SERPL-MCNC: 0.3 MG/DL (ref 0.2–1)
BUN/CREAT SERPL: 28 (ref 6–20)
CA-I SERPL ISE-MCNC: 31 MG/DL (ref 7–20)
CALCIUM SERPL-MCNC: 8.7 MG/DL (ref 8.5–10.1)
CHLORIDE SERPL-SCNC: 106 MMOL/L (ref 98–107)
CO2 SERPL-SCNC: 30 MMOL/L (ref 21–32)
CREAT SERPL-MCNC: 1.1 MG/DL (ref 0.6–1)
GFR SERPLBLD BASED ON 1.73 SQ M-ARVRAT: 47.7 ML/MIN
GLOBULIN SER-MCNC: 3.1 G/DL (ref 2.2–3.8)
GLUCOSE SERPL-MCNC: 112 MG/DL (ref 70–99)
MAGNESIUM SERPL-MCNC: 2.1 MG/DL (ref 1.8–2.4)
POTASSIUM SERPL-SCNC: 4.3 MMOL/L (ref 3.5–5.1)
PROT SERPL-MCNC: 6.3 G/DL (ref 6.4–8.2)
SODIUM SERPL-SCNC: 143 MMOL/L (ref 136–145)

## 2022-04-07 PROCEDURE — 84443 ASSAY THYROID STIM HORMONE: CPT

## 2022-04-07 PROCEDURE — 84439 ASSAY OF FREE THYROXINE: CPT

## 2022-04-07 PROCEDURE — 80053 COMPREHEN METABOLIC PANEL: CPT

## 2022-04-07 PROCEDURE — 71046 X-RAY EXAM CHEST 2 VIEWS: CPT

## 2022-04-07 PROCEDURE — 36415 COLL VENOUS BLD VENIPUNCTURE: CPT

## 2022-04-07 PROCEDURE — 83735 ASSAY OF MAGNESIUM: CPT

## 2022-04-07 NOTE — RAD
EXAMINATION: XR CHEST 2V



CLINICAL HISTORY: Cough.



EXAM DATE/TIME: 4/7/2022 12:27 PM



COMPARISON: None





FINDINGS: 



Lines, Tubes, and Devices: None.



Cardiomediastinal Silhouette: Borderline cardiomegaly. Aortic atherosclerotic calcification.



Lungs and Pleura: No evidence of focal airspace consolidation or pleural effusion. Pulmonary vasculat
ure unremarkable.



Bones and Soft Tissues: Degenerative changes in the thoracic spine. Epigastric surgical clips.

 



IMPRESSION:



No evidence of acute cardiopulmonary abnormality.



Electronically signed by: Daniel Alfredo DO (4/7/2022 4:39 PM) [id number]

## 2022-04-08 LAB
T4 FREE SERPL-MCNC: 1.19 NG/DL (ref 0.76–1.46)
THYROID STIM HORMONE (TSH): 3.94 UIU/ML (ref 0.36–3.74)

## 2022-04-19 ENCOUNTER — HOSPITAL ENCOUNTER (OUTPATIENT)
Dept: HOSPITAL 63 - MAMMO | Age: 82
End: 2022-04-19
Attending: INTERNAL MEDICINE
Payer: MEDICARE

## 2022-04-19 DIAGNOSIS — Z12.31: Primary | ICD-10-CM

## 2022-04-19 PROCEDURE — 77067 SCR MAMMO BI INCL CAD: CPT

## 2022-04-19 PROCEDURE — 77063 BREAST TOMOSYNTHESIS BI: CPT

## 2022-04-19 NOTE — RAD
BILATERAL SCREENING MAMMOGRAM



History: Routine screening.



Comparison:  Most recently on 4/15/2021. 



Technique: Routine 2D and 3D tomosynthesis digital mammogram views were obtained bilaterally. Interpr
etation was assisted with the use of computer-aided detection.



Findings: 



Breast Tissue Density B : There are scattered areas of fibroglandular density.



There are no dominant masses, suspicious microcalcifications, or architectural distortion.



IMPRESSION:



No mammographic evidence of malignancy. Recommend routine screening mammography in one year.



BI-RADS category 1:  Negative.



Patient information is entered into the reminder system with a target due date for the next screening
 mammogram.



"Our facility is accredited by the [employer] Mammography Program."



Electronically signed by: MICHAEL BOUDREAUX MD (4/19/2022 1:54 PM) [id number]

## 2022-04-22 ENCOUNTER — HOSPITAL ENCOUNTER (EMERGENCY)
Dept: HOSPITAL 63 - ER | Age: 82
Discharge: HOME | End: 2022-04-22
Payer: MEDICARE

## 2022-04-22 VITALS — DIASTOLIC BLOOD PRESSURE: 92 MMHG | SYSTOLIC BLOOD PRESSURE: 156 MMHG

## 2022-04-22 VITALS — HEIGHT: 70 IN | WEIGHT: 170.42 LBS | BODY MASS INDEX: 24.4 KG/M2
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
## 2022-04-22 DIAGNOSIS — I48.91: ICD-10-CM

## 2022-04-22 DIAGNOSIS — S81.811A: Primary | ICD-10-CM
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
## 2022-04-22 DIAGNOSIS — Z79.82: ICD-10-CM
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
## 2022-04-22 PROCEDURE — 12001 RPR S/N/AX/GEN/TRNK 2.5CM/<: CPT

## 2022-04-22 PROCEDURE — 99282 EMERGENCY DEPT VISIT SF MDM: CPT

## 2022-04-22 NOTE — PHYS DOC
Past History


Past Medical History:  A-Fib


 (CARLO BARRETO)


Past Surgical History:  Other


Additional Past Surgical Histo:  IV filter


 (CARLO BARRETO)


Alcohol Use:  None


 (CARLO BARRETO)





General Adult


EDM:


Chief Complaint:  LACERATION/AVULSION





HPI:


HPI:


Patient is an 82-year-old female who presents to the emergency department today 

for a laceration to her right leg.  Patient reports that she was doing some yard

work outside when she scratched her leg on a brick.  Patient denies any blood 

thinner use but reports she takes daily aspirin.  She reports that her tetanus 

is up-to-date.  Patient is concerned because she cannot get the skin tear to 

stop bleeding.


 (CARLO BARRETO)





Review of Systems:


Review of Systems:





Musculoskeletal: Denies pain to leg


Integument: See HPI


Neurologic: Denies any decreased sensation to her extremity


 (CARLO BARRETO)





Current Medications:


Current Meds:





Current Medications








 Medications


  (Trade)  Dose


 Ordered  Sig/Josh  Start Time


 Stop Time Status Last Admin


Dose Admin


 


 Lidocaine/


 Epinephrine


  (Xylocaine


 1%-Epi 1:100,000)  20 ml  1X  ONCE  22 16:45


 22 16:46 UNV  











 (CARLO BARRETO)





Allergies:


Allergies:





Allergies








Coded Allergies Type Severity Reaction Last Updated Verified


 


  No Known Drug Allergies    12/15/20 No








 (CARLO BARRETO)





Physical Exam:


PE:





Constitutional: Well developed, well nourished, no acute distress, non-toxic 

appearance. []


HENT: Normocephalic, atraumatic, bilateral external ears normal, oropharynx 

moist, no oral exudates, nose normal. []


Eyes: PERRL, EOMI, conjunctiva normal, no discharge. [] 


Neck: Normal range of motion, no stridor


Cardiovascular: Normal peripheral perfusion


Lungs & Thorax: Normal work of breathing, no tachypnea


Abdomen: Soft and flat


Skin: Warm, dry, no erythema, no rash. [] 


Back: No tenderness, normal range of motion


Extremities: No tenderness, no cyanosis, no clubbing, ROM intact, no edema. [] 

right le cm skin tear noted to anterior aspect of patient's right leg with 

active bleeding noted, wound not suturable.  No obvious deformity, patient able 

to bear weight and ambulate, normal range of motion, neuro intact


Neurologic: Alert and oriented X 3, normal motor function, normal sensory 

function, no focal deficits noted. []


Psychologic: Affect normal, judgement normal, mood normal. []


 (CARLO BARRETO)





EKG:


EKG:


[]


 (CARLO BARRETO)





Radiology/Procedures:


Radiology/Procedures:


[]


 (CARLO BARRETO)





Heart Score:


C/O Chest Pain:  N/A


Risk Factors:


Risk Factors:  DM, Current or recent (<one month) smoker, HTN, HLP, family 

history of CAD, obesity.


Risk Scores:


Score 0 - 3:  2.5% MACE over next 6 weeks - Discharge Home


Score 4 - 6:  20.3% MACE over next 6 weeks - Admit for Clinical Observation


Score 7 - 10:  72.7% MACE over next 6 weeks - Early Invasive Strategies


 (CARLO BARRETO)





Course & Med Decision Making:


Course & Med Decision Making


Pertinent Labs and Imaging studies reviewed. (See chart for details)





[] Patient presents to the emergency department for skin tear to the anterior 

aspect of her left lower leg.  Wound is actively bleeding, there is no area that

 is suturable as she has a skin tear.  lidocaine with epi was ordered to inject 

at the site of bleeding to help vasoconstrict and allow for dermabond repair of 

skin tear. Upon return to patients room, her bleeding had stopped and the skin 

tear was repaired with dermabond. Patient tolerated procedure. Bleeding has 

discontinued.  Patient educated on laceration care.  I discussed with patient 

all findings and diagnostic testing as well as the need to follow-up with PCP 

for further evaluation and treatment or return to the ER if any new or worsening

 symptoms.  Strict return precautions were also discussed at length.  Patient 

voiced understanding and agreement with the plan.  Patient is hemodynamically 

stable at the time of disposition.


 (CARLO BARRETO)





Dragon Disclaimer:


Dragon Disclaimer:


This electronic medical record was generated, in whole or in part, using a voice

 recognition dictation system.


 (CARLO BARRETO)


Attending Co-Sign


The patient was seen and interviewed as well as examined at the bedside. The 

chart was reviewed. The case was discussed. Agree with the plan of care.


 (EFRAIN COLLINS DO)





Departure


Departure:


Impression:  


   Primary Impression:  


   Skin tear


Disposition:  01 HOME / SELF CARE / HOMELESS


Condition:  GOOD


Referrals:  


COOKIE FLEMING MD (PCP)


Patient Instructions:  Skin Tear Care





Additional Instructions:  


You are seen in the emergency department today for a skin tear which was 

repaired with skin glue.  Please do not pick at the skin glue.  Do not submerge 

your leg in any water for 2 to 3 days.  You can keep this clean and dry with 

mild soap and warm water.  Do not apply any ointments like triple antibiotic 

ointment or Polysporin ointment as it will erode the glue.  You can take Tylenol

 or Motrin if you develop any pain.  Monitor for any signs of infection which 

include redness, warmth, swelling or drainage.  Follow-up with your primary care

 provider within 2 days for wound recheck.  Return to the emergency department 

if you develop any signs of infection, increased pain, high fevers refractory to

 treatment, tractable nausea or vomiting, inability to bear weight or walk.











CARLO BARRETO          2022 16:51


EFRAIN COLLINS DO                 2022 06:16